# Patient Record
Sex: MALE | NOT HISPANIC OR LATINO | Employment: STUDENT | URBAN - METROPOLITAN AREA
[De-identification: names, ages, dates, MRNs, and addresses within clinical notes are randomized per-mention and may not be internally consistent; named-entity substitution may affect disease eponyms.]

---
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
## 2020-12-03 ENCOUNTER — OFFICE VISIT (OUTPATIENT)
Dept: URGENT CARE | Facility: CLINIC | Age: 20
End: 2020-12-03

## 2020-12-03 VITALS
BODY MASS INDEX: 16.94 KG/M2 | WEIGHT: 132 LBS | TEMPERATURE: 97 F | OXYGEN SATURATION: 97 % | HEART RATE: 80 BPM | DIASTOLIC BLOOD PRESSURE: 65 MMHG | SYSTOLIC BLOOD PRESSURE: 115 MMHG | HEIGHT: 74 IN

## 2020-12-03 DIAGNOSIS — M54.50 CHRONIC BILATERAL LOW BACK PAIN WITHOUT SCIATICA: Primary | ICD-10-CM

## 2020-12-03 DIAGNOSIS — G89.29 CHRONIC BILATERAL LOW BACK PAIN WITHOUT SCIATICA: Primary | ICD-10-CM

## 2020-12-03 DIAGNOSIS — M41.9 SCOLIOSIS OF THORACOLUMBAR SPINE, UNSPECIFIED SCOLIOSIS TYPE: ICD-10-CM

## 2020-12-03 PROCEDURE — 99214 PR OFFICE/OUTPT VISIT, EST, LEVL IV, 30-39 MIN: ICD-10-PCS | Mod: TIER,S$GLB,, | Performed by: PHYSICIAN ASSISTANT

## 2020-12-03 PROCEDURE — 99214 OFFICE O/P EST MOD 30 MIN: CPT | Mod: TIER,S$GLB,, | Performed by: PHYSICIAN ASSISTANT

## 2020-12-03 RX ORDER — IBUPROFEN 600 MG/1
600 TABLET ORAL EVERY 6 HOURS PRN
Qty: 30 TABLET | Refills: 1 | Status: SHIPPED | OUTPATIENT
Start: 2020-12-03 | End: 2021-01-02

## 2020-12-03 RX ORDER — METHOCARBAMOL 500 MG/1
1000 TABLET, FILM COATED ORAL 3 TIMES DAILY
Qty: 30 TABLET | Refills: 0 | Status: SHIPPED | OUTPATIENT
Start: 2020-12-03 | End: 2020-12-08

## 2020-12-03 NOTE — PROGRESS NOTES
"Subjective:       Patient ID: Agusto Duckworth is a 20 y.o. male.    Vitals:  height is 6' 2" (1.88 m) and weight is 59.9 kg (132 lb). His temperature is 97.4 °F (36.3 °C). His blood pressure is 115/65 and his pulse is 80. His oxygen saturation is 97%.     Chief Complaint: Back Pain    Pt has been having lower back pain for the last couple months, pt had been taking advil for pain which has been helping slightly. Pt does have scoliosis.     Back Pain  This is a new problem. The current episode started more than 1 month ago. The problem occurs constantly. The problem has been gradually worsening since onset. The pain is at a severity of 5/10. The pain is mild. The pain is worse during the day. Stiffness is present all day. Pertinent negatives include no chest pain, dysuria, fever or headaches. The treatment provided mild relief.       Constitution: Negative for chills, fatigue and fever.   HENT: Negative for congestion and sore throat.    Neck: Negative for painful lymph nodes.   Cardiovascular: Negative for chest pain and leg swelling.   Eyes: Negative for double vision and blurred vision.   Respiratory: Negative for cough and shortness of breath.    Gastrointestinal: Negative for nausea, vomiting and diarrhea.   Genitourinary: Negative for dysuria, frequency and urgency.   Musculoskeletal: Positive for back pain and muscle ache. Negative for joint pain, joint swelling and muscle cramps.   Skin: Negative for color change, pale and rash.   Allergic/Immunologic: Negative for seasonal allergies.   Neurological: Negative for dizziness, history of vertigo, light-headedness, passing out and headaches.   Hematologic/Lymphatic: Negative for swollen lymph nodes, easy bruising/bleeding and history of blood clots. Does not bruise/bleed easily.   Psychiatric/Behavioral: Negative for nervous/anxious, sleep disturbance and depression. The patient is not nervous/anxious.        Objective:      Physical Exam   Constitutional: He is " oriented to person, place, and time. He appears well-developed. He is cooperative.  Non-toxic appearance. He does not appear ill. No distress.   HENT:   Head: Normocephalic and atraumatic.   Ears:   Right Ear: Hearing, tympanic membrane and ear canal normal.   Left Ear: Hearing, tympanic membrane and ear canal normal.   Nose: No mucosal edema, rhinorrhea or nasal deformity. No epistaxis. Right sinus exhibits no maxillary sinus tenderness and no frontal sinus tenderness. Left sinus exhibits no maxillary sinus tenderness and no frontal sinus tenderness.   Mouth/Throat: Uvula is midline and mucous membranes are normal. No trismus in the jaw. Normal dentition. No uvula swelling. No posterior oropharyngeal edema or posterior oropharyngeal erythema.   Eyes: Conjunctivae and lids are normal. No scleral icterus.   Neck: Trachea normal, full passive range of motion without pain and phonation normal. Neck supple. No neck rigidity. No edema and no erythema present.   Cardiovascular: Normal rate, regular rhythm, normal heart sounds and normal pulses.   Pulmonary/Chest: Effort normal and breath sounds normal. No respiratory distress. He has no decreased breath sounds. He has no rhonchi.   Abdominal: Normal appearance.   Musculoskeletal:         General: No deformity.      Right shoulder: He exhibits decreased range of motion, tenderness and pain.      Comments: Significant scoliosis noted on back exam   Neurological: He is alert and oriented to person, place, and time. He exhibits normal muscle tone. Coordination normal.   Skin: Skin is warm, dry, intact, not diaphoretic, not pale and no rash. Psychiatric: His speech is normal and behavior is normal. Judgment and thought content normal.   Nursing note and vitals reviewed.        Assessment:       1. Chronic bilateral low back pain without sciatica    2. Scoliosis of thoracolumbar spine, unspecified scoliosis type        Plan:         Chronic bilateral low back pain without  sciatica  -     ibuprofen (ADVIL,MOTRIN) 600 MG tablet; Take 1 tablet (600 mg total) by mouth every 6 (six) hours as needed.  Dispense: 30 tablet; Refill: 1  -     methocarbamoL (ROBAXIN) 500 MG Tab; Take 2 tablets (1,000 mg total) by mouth 3 (three) times daily. for 5 days  Dispense: 30 tablet; Refill: 0  -     Ambulatory referral/consult to Orthopedics    Scoliosis of thoracolumbar spine, unspecified scoliosis type  -     ibuprofen (ADVIL,MOTRIN) 600 MG tablet; Take 1 tablet (600 mg total) by mouth every 6 (six) hours as needed.  Dispense: 30 tablet; Refill: 1  -     methocarbamoL (ROBAXIN) 500 MG Tab; Take 2 tablets (1,000 mg total) by mouth 3 (three) times daily. for 5 days  Dispense: 30 tablet; Refill: 0  -     Ambulatory referral/consult to Orthopedics         Agusto was seen today for back pain.    Diagnoses and all orders for this visit:    Chronic bilateral low back pain without sciatica  -     ibuprofen (ADVIL,MOTRIN) 600 MG tablet; Take 1 tablet (600 mg total) by mouth every 6 (six) hours as needed.  -     methocarbamoL (ROBAXIN) 500 MG Tab; Take 2 tablets (1,000 mg total) by mouth 3 (three) times daily. for 5 days  -     Ambulatory referral/consult to Orthopedics    Scoliosis of thoracolumbar spine, unspecified scoliosis type  -     ibuprofen (ADVIL,MOTRIN) 600 MG tablet; Take 1 tablet (600 mg total) by mouth every 6 (six) hours as needed.  -     methocarbamoL (ROBAXIN) 500 MG Tab; Take 2 tablets (1,000 mg total) by mouth 3 (three) times daily. for 5 days  -     Ambulatory referral/consult to Orthopedics      Patient Instructions     - Rest.  - Drink plenty of fluids.  - Take Tylenol and/or Ibuprofen as directed as needed for fever/pain.  Do not take more than the recommended dose.  - follow up with your PCP within the next 1-2 weeks as needed.  - Warm compresses to the affected area for 20 minutes at a time.  - You must understand that you have received an Urgent Care treatment only and that you may be  released before all of your medical problems are known or treated.   - You, the patient, will arrange for follow up care as instructed.   - If your condition worsens or fails to improve we recommend that you receive another evaluation at the ER immediately or contact your PCP to discuss your concerns.   - You can call [phone] or [phone] to help schedule an appointment with the appropriate provider.      Back Pain (Acute or Chronic)    Back pain is one of the most common problems. The good news is that most people feel better in 1 to 2 weeks, and most of the rest in 1 to 2 months. Most people can remain active.  People experience and describe pain differently; not everyone is the same.  · The pain can be sharp, stabbing, shooting, aching, cramping or burning.  · Movement, standing, bending, lifting, sitting, or walking may worsen pain.  · It can be localized to one spot or area, or it can be more generalized.  · It can spread or radiate upwards, to the front, or go down your arms or legs (sciatica).  · It can cause muscle spasm.  Most of the time, mechanical problems with the muscles or spine cause the pain. Mechanical problems are usually caused by an injury to the muscles or ligaments. While illness can cause back pain, it is usually not caused by a serious illness. Mechanical problems include:   · Physical activity such as sports, exercise, work, or normal activity  · Overexertion, lifting, pushing, pulling incorrectly or too aggressively  · Sudden twisting, bending, or stretching from an accident, or accidental movement  · Poor posture  · Stretching or moving wrong, without noticing pain at the time  · Poor coordination, lack of regular exercise (check with your doctor about this)  · Spinal disc disease or arthritis  · Stress  Pain can also be related to pregnancy, or illness like appendicitis, bladder or kidney infections, pelvic infections, and many other things.  Acute back pain usually gets  better in 1 to 2 weeks. Back pain related to disk disease, arthritis in the spinal joints or spinal stenosis (narrowing of the spinal canal) can become chronic and last for months or years.  Unless you had a physical injury (for example, a car accident or fall) X-rays are usually not needed for the initial evaluation of back pain. If pain continues and does not respond to medical treatment, X-rays and other tests may be needed.  Home care  Try these home care recommendations:  · When in bed, try to find a position of comfort. A firm mattress is best. Try lying flat on your back with pillows under your knees. You can also try lying on your side with your knees bent up towards your chest and a pillow between your knees.  · At first, do not try to stretch out the sore spots. If there is a strain, it is not like the good soreness you get after exercising without an injury. In this case, stretching may make it worse.  · Avoid prolong sitting, long car rides, or travel. This puts more stress on the lower back than standing or walking.  · During the first 24 to 72 hours after an acute injury or flare up of chronic back pain, apply an ice pack to the painful area for 20 minutes and then remove it for 20 minutes. Do this over a period of 60 to 90 minutes or several times a day. This will reduce swelling and pain. Wrap the ice pack in a thin towel or plastic to protect your skin.  · You can start with ice, then switch to heat. Heat (hot shower, hot bath, or heating pad) reduces pain and works well for muscle spasms. Heat can be applied to the painful area for 20 minutes then remove it for 20 minutes. Do this over a period of 60 to 90 minutes or several times a day. Do not sleep on a heating pad. It can lead to skin burns or tissue damage.  · You can alternate ice and heat therapy. Talk with your doctor about the best treatment for your back pain.  · Therapeutic massage can help relax the back muscles without stretching  them.  · Be aware of safe lifting methods and do not lift anything without stretching first.  Medicines  Talk to your doctor before using medicine, especially if you have other medical problems or are taking other medicines.  · You may use over-the-counter medicine as directed on the bottle to control pain, unless another pain medicine was prescribed. If you have chronic conditions like diabetes, liver or kidney disease, stomach ulcers, or gastrointestinal bleeding, or are taking blood thinners, talk to your doctor before taking any medicine.  · Be careful if you are given a prescription medicines, narcotics, or medicine for muscle spasms. They can cause drowsiness, affect your coordination, reflexes, and judgement. Do not drive or operate heavy machinery.  Follow-up care  Follow up with your healthcare provider, or as advised.   A radiologist will review any X-rays that were taken. Your provide will notify you of any new findings that may affect your care.  Call 911  Call emergency services if any of the following occur:  · Trouble breathing  · Confusion  · Very drowsy or trouble awakening  · Fainting or loss of consciousness  · Rapid or very slow heart rate  · Loss of bowel or bladder control  When to seek medical advice  Call your healthcare provider right away if any of these occur:   · Pain becomes worse or spreads to your legs  · Weakness or numbness in one or both legs  · Numbness in the groin or genital area  Date Last Reviewed: 7/1/2016 © 2000-2017 [employer]. [address] [place] [address]. All rights reserved. This information is not intended as a substitute for professional medical care. Always follow your healthcare professional's instructions.        Understanding Scoliosis  Scoliosis is a problem that makes the spine curve and twist from side to side. It is most often found in girls in their early teens. But boys can have it, too. No one is sure what causes scoliosis. But we  "do know that scoliosis is not caused by things like carrying heavy bags or playing sports. If someone in your family (like a parent or a sibling) has scoliosis, you may be more likely to have it, too.    What are the signs?  The signs of scoliosis may include:  · One shoulder higher than the other  · One shoulder blade sticking out farther than the other  · An uneven waistline  · Hems hanging unevenly on you  These signs often appear slowly as you grow. You and your parents may not even notice them. For this reason, schools in many states have screening programs to check for scoliosis. These programs help find scoliosis early and keep it from causing problems later.  A note to parents  ?Here are some suggestions:  · Hearing that your child has scoliosis can be upsetting. But remember that mild cases may not need treatment. If your child's scoliosis is severe or worsening, it can be treated.   · Go to all your childs appointments. Ask questions and be sure you understand the healthcare providers instructions.  · Become informed about scoliosis. Try the library or do a search on the Internet.  · Form a team with your child and the healthcare provider. Be your childs  and biggest fan.  · Know that each child is different. Your childs healthcare provider will choose the treatment that is best for your childs spine.   Date Last Reviewed: 10/17/2015  © 6394-4250 [employer]. [address] [place] [address], [place] [address]. All rights reserved. This information is not intended as a substitute for professional medical care. Always follow your healthcare professional's instructions.        Treating Scoliosis  Having scoliosis means that your spine (backbone) curves and twists from side to side instead of growing straight. Your doctor will suggest the best treatment for you based on your age, how much more you are likely to grow, and the size and type of your spinal curve.     "I told my friends about my " "scoliosis. They helped me feel better about it."   How is scoliosis treated?  The three types of treatment for scoliosis are:  · Observation--Watching a small curve to see if it gets better or worse as you grow.  · Bracing--Wearing a brace until your spine is fully grown to keep your curve from getting worse. For many teens with scoliosis, wearing a brace is the best treatment. It may also help keep you from needing surgery.  · Surgery--Operating to correct a very serious curve.  How a brace works  · A scoliosis brace is made out of plastic and shaped to fit your body. The brace holds your spine in place to keep your curve from getting worse. To do this, you need to wear it almost all the time until you are fully grown.  · There are several kinds of braces. Your doctor will talk to you about the best one for your type of scoliosis.  · An orthotist is the person who makes and fits the brace. You will see the orthotist a few times for adjustments before the brace fits you right.  Why wear your brace?  The brace helps keep your scoliosis from getting worse. If your scoliosis does get worse, you may need surgery. Surgery often leaves a big scar. And surgery can be hard to recover from. Also, it may be a long time after surgery before you can go out and be active again.  To learn more, contact:  · National Scoliosis Foundation  570.277.2011  · Scoliosis Research Society  [phone]   Date Last Reviewed: 9/20/2015  © 2338-0437 [employer]. [address] [place] [address], [place] [address]. All rights reserved. This information is not intended as a substitute for professional medical care. Always follow your healthcare professional's instructions.            "

## 2020-12-03 NOTE — PATIENT INSTRUCTIONS
- Rest.  - Drink plenty of fluids.  - Take Tylenol and/or Ibuprofen as directed as needed for fever/pain.  Do not take more than the recommended dose.  - follow up with your PCP within the next 1-2 weeks as needed.  - Warm compresses to the affected area for 20 minutes at a time.  - You must understand that you have received an Urgent Care treatment only and that you may be released before all of your medical problems are known or treated.   - You, the patient, will arrange for follow up care as instructed.   - If your condition worsens or fails to improve we recommend that you receive another evaluation at the ER immediately or contact your PCP to discuss your concerns.   - You can call [phone] or [phone] to help schedule an appointment with the appropriate provider.      Back Pain (Acute or Chronic)    Back pain is one of the most common problems. The good news is that most people feel better in 1 to 2 weeks, and most of the rest in 1 to 2 months. Most people can remain active.  People experience and describe pain differently; not everyone is the same.  · The pain can be sharp, stabbing, shooting, aching, cramping or burning.  · Movement, standing, bending, lifting, sitting, or walking may worsen pain.  · It can be localized to one spot or area, or it can be more generalized.  · It can spread or radiate upwards, to the front, or go down your arms or legs (sciatica).  · It can cause muscle spasm.  Most of the time, mechanical problems with the muscles or spine cause the pain. Mechanical problems are usually caused by an injury to the muscles or ligaments. While illness can cause back pain, it is usually not caused by a serious illness. Mechanical problems include:   · Physical activity such as sports, exercise, work, or normal activity  · Overexertion, lifting, pushing, pulling incorrectly or too aggressively  · Sudden twisting, bending, or stretching from an accident, or accidental movement  · Poor  posture  · Stretching or moving wrong, without noticing pain at the time  · Poor coordination, lack of regular exercise (check with your doctor about this)  · Spinal disc disease or arthritis  · Stress  Pain can also be related to pregnancy, or illness like appendicitis, bladder or kidney infections, pelvic infections, and many other things.  Acute back pain usually gets better in 1 to 2 weeks. Back pain related to disk disease, arthritis in the spinal joints or spinal stenosis (narrowing of the spinal canal) can become chronic and last for months or years.  Unless you had a physical injury (for example, a car accident or fall) X-rays are usually not needed for the initial evaluation of back pain. If pain continues and does not respond to medical treatment, X-rays and other tests may be needed.  Home care  Try these home care recommendations:  · When in bed, try to find a position of comfort. A firm mattress is best. Try lying flat on your back with pillows under your knees. You can also try lying on your side with your knees bent up towards your chest and a pillow between your knees.  · At first, do not try to stretch out the sore spots. If there is a strain, it is not like the good soreness you get after exercising without an injury. In this case, stretching may make it worse.  · Avoid prolong sitting, long car rides, or travel. This puts more stress on the lower back than standing or walking.  · During the first 24 to 72 hours after an acute injury or flare up of chronic back pain, apply an ice pack to the painful area for 20 minutes and then remove it for 20 minutes. Do this over a period of 60 to 90 minutes or several times a day. This will reduce swelling and pain. Wrap the ice pack in a thin towel or plastic to protect your skin.  · You can start with ice, then switch to heat. Heat (hot shower, hot bath, or heating pad) reduces pain and works well for muscle spasms. Heat can be applied to the painful area for  20 minutes then remove it for 20 minutes. Do this over a period of 60 to 90 minutes or several times a day. Do not sleep on a heating pad. It can lead to skin burns or tissue damage.  · You can alternate ice and heat therapy. Talk with your doctor about the best treatment for your back pain.  · Therapeutic massage can help relax the back muscles without stretching them.  · Be aware of safe lifting methods and do not lift anything without stretching first.  Medicines  Talk to your doctor before using medicine, especially if you have other medical problems or are taking other medicines.  · You may use over-the-counter medicine as directed on the bottle to control pain, unless another pain medicine was prescribed. If you have chronic conditions like diabetes, liver or kidney disease, stomach ulcers, or gastrointestinal bleeding, or are taking blood thinners, talk to your doctor before taking any medicine.  · Be careful if you are given a prescription medicines, narcotics, or medicine for muscle spasms. They can cause drowsiness, affect your coordination, reflexes, and judgement. Do not drive or operate heavy machinery.  Follow-up care  Follow up with your healthcare provider, or as advised.   A radiologist will review any X-rays that were taken. Your provide will notify you of any new findings that may affect your care.  Call 911  Call emergency services if any of the following occur:  · Trouble breathing  · Confusion  · Very drowsy or trouble awakening  · Fainting or loss of consciousness  · Rapid or very slow heart rate  · Loss of bowel or bladder control  When to seek medical advice  Call your healthcare provider right away if any of these occur:   · Pain becomes worse or spreads to your legs  · Weakness or numbness in one or both legs  · Numbness in the groin or genital area  Date Last Reviewed: 7/1/2016  © 3909-3260 [employer]. [address] [place] [address], [place] [address]. All rights reserved. This  information is not intended as a substitute for professional medical care. Always follow your healthcare professional's instructions.        Understanding Scoliosis  Scoliosis is a problem that makes the spine curve and twist from side to side. It is most often found in girls in their early teens. But boys can have it, too. No one is sure what causes scoliosis. But we do know that scoliosis is not caused by things like carrying heavy bags or playing sports. If someone in your family (like a parent or a sibling) has scoliosis, you may be more likely to have it, too.    What are the signs?  The signs of scoliosis may include:  · One shoulder higher than the other  · One shoulder blade sticking out farther than the other  · An uneven waistline  · Hems hanging unevenly on you  These signs often appear slowly as you grow. You and your parents may not even notice them. For this reason, schools in many states have screening programs to check for scoliosis. These programs help find scoliosis early and keep it from causing problems later.  A note to parents  ?Here are some suggestions:  · Hearing that your child has scoliosis can be upsetting. But remember that mild cases may not need treatment. If your child's scoliosis is severe or worsening, it can be treated.   · Go to all your childs appointments. Ask questions and be sure you understand the healthcare providers instructions.  · Become informed about scoliosis. Try the library or do a search on the Internet.  · Form a team with your child and the healthcare provider. Be your childs  and biggest fan.  · Know that each child is different. Your childs healthcare provider will choose the treatment that is best for your childs spine.   Date Last Reviewed: 10/17/2015  © 2112-0024 [employer]. [address] [place] [address], [place] [address]. All rights reserved. This information is not intended as a substitute for professional medical care. Always follow your  "healthcare professional's instructions.        Treating Scoliosis  Having scoliosis means that your spine (backbone) curves and twists from side to side instead of growing straight. Your doctor will suggest the best treatment for you based on your age, how much more you are likely to grow, and the size and type of your spinal curve.     "I told my friends about my scoliosis. They helped me feel better about it."   How is scoliosis treated?  The three types of treatment for scoliosis are:  · Observation--Watching a small curve to see if it gets better or worse as you grow.  · Bracing--Wearing a brace until your spine is fully grown to keep your curve from getting worse. For many teens with scoliosis, wearing a brace is the best treatment. It may also help keep you from needing surgery.  · Surgery--Operating to correct a very serious curve.  How a brace works  · A scoliosis brace is made out of plastic and shaped to fit your body. The brace holds your spine in place to keep your curve from getting worse. To do this, you need to wear it almost all the time until you are fully grown.  · There are several kinds of braces. Your doctor will talk to you about the best one for your type of scoliosis.  · An orthotist is the person who makes and fits the brace. You will see the orthotist a few times for adjustments before the brace fits you right.  Why wear your brace?  The brace helps keep your scoliosis from getting worse. If your scoliosis does get worse, you may need surgery. Surgery often leaves a big scar. And surgery can be hard to recover from. Also, it may be a long time after surgery before you can go out and be active again.  To learn more, contact:  · National Scoliosis Foundation  952.893.1500  · Scoliosis Research Society  [phone]   Date Last Reviewed: 9/20/2015  © 0304-9469 [employer]. [address] [place] [address], [place] [address]. All rights reserved. This information is not intended as a " substitute for professional medical care. Always follow your healthcare professional's instructions.

## 2022-12-01 ENCOUNTER — HOSPITAL ENCOUNTER (EMERGENCY)
Facility: HOSPITAL | Age: 22
Discharge: HOME | End: 2022-12-01
Payer: MEDICAID

## 2022-12-01 VITALS
HEIGHT: 74 IN | BODY MASS INDEX: 16.95 KG/M2 | DIASTOLIC BLOOD PRESSURE: 85 MMHG | TEMPERATURE: 98 F | OXYGEN SATURATION: 99 % | HEART RATE: 70 BPM | SYSTOLIC BLOOD PRESSURE: 112 MMHG | RESPIRATION RATE: 16 BRPM

## 2022-12-01 DIAGNOSIS — R11.2 NAUSEA AND VOMITING, UNSPECIFIED VOMITING TYPE: ICD-10-CM

## 2022-12-01 DIAGNOSIS — M54.50 ACUTE LEFT-SIDED LOW BACK PAIN WITHOUT SCIATICA: Primary | ICD-10-CM

## 2022-12-01 DIAGNOSIS — R09.81 NASAL CONGESTION: ICD-10-CM

## 2022-12-01 LAB
ALBUMIN SERPL BCP-MCNC: 4.4 G/DL (ref 3.5–5.2)
ALP SERPL-CCNC: 60 U/L (ref 55–135)
ALT SERPL W/O P-5'-P-CCNC: 11 U/L (ref 10–44)
ANION GAP SERPL CALC-SCNC: 11 MMOL/L (ref 8–16)
AST SERPL-CCNC: 13 U/L (ref 10–40)
BACTERIA #/AREA URNS HPF: NORMAL /HPF
BASOPHILS # BLD AUTO: 0.02 K/UL (ref 0–0.2)
BASOPHILS NFR BLD: 0.2 % (ref 0–1.9)
BILIRUB SERPL-MCNC: 0.5 MG/DL (ref 0.1–1)
BILIRUB UR QL STRIP: NEGATIVE
BUN SERPL-MCNC: 6 MG/DL (ref 6–20)
CALCIUM SERPL-MCNC: 9.5 MG/DL (ref 8.7–10.5)
CHLORIDE SERPL-SCNC: 101 MMOL/L (ref 95–110)
CLARITY UR: CLEAR
CO2 SERPL-SCNC: 27 MMOL/L (ref 23–29)
COLOR UR: YELLOW
CREAT SERPL-MCNC: 0.8 MG/DL (ref 0.5–1.4)
CTP QC/QA: YES
DIFFERENTIAL METHOD: ABNORMAL
EOSINOPHIL # BLD AUTO: 0.1 K/UL (ref 0–0.5)
EOSINOPHIL NFR BLD: 0.5 % (ref 0–8)
ERYTHROCYTE [DISTWIDTH] IN BLOOD BY AUTOMATED COUNT: 12.2 % (ref 11.5–14.5)
EST. GFR  (NO RACE VARIABLE): >60 ML/MIN/1.73 M^2
GLUCOSE SERPL-MCNC: 105 MG/DL (ref 70–110)
GLUCOSE UR QL STRIP: ABNORMAL
HCT VFR BLD AUTO: 47.6 % (ref 40–54)
HGB BLD-MCNC: 15.9 G/DL (ref 14–18)
HGB UR QL STRIP: NEGATIVE
HYALINE CASTS #/AREA URNS LPF: 1 /LPF
IMM GRANULOCYTES # BLD AUTO: 0.03 K/UL (ref 0–0.04)
IMM GRANULOCYTES NFR BLD AUTO: 0.3 % (ref 0–0.5)
KETONES UR QL STRIP: ABNORMAL
LEUKOCYTE ESTERASE UR QL STRIP: NEGATIVE
LIPASE SERPL-CCNC: 9 U/L (ref 4–60)
LYMPHOCYTES # BLD AUTO: 1.1 K/UL (ref 1–4.8)
LYMPHOCYTES NFR BLD: 10.4 % (ref 18–48)
MCH RBC QN AUTO: 28.4 PG (ref 27–31)
MCHC RBC AUTO-ENTMCNC: 33.4 G/DL (ref 32–36)
MCV RBC AUTO: 85 FL (ref 82–98)
MICROSCOPIC COMMENT: NORMAL
MONOCYTES # BLD AUTO: 1.1 K/UL (ref 0.3–1)
MONOCYTES NFR BLD: 9.9 % (ref 4–15)
NEUTROPHILS # BLD AUTO: 8.5 K/UL (ref 1.8–7.7)
NEUTROPHILS NFR BLD: 78.7 % (ref 38–73)
NITRITE UR QL STRIP: NEGATIVE
NRBC BLD-RTO: 0 /100 WBC
PH UR STRIP: 6 [PH] (ref 5–8)
PLATELET # BLD AUTO: 379 K/UL (ref 150–450)
PMV BLD AUTO: 9.1 FL (ref 9.2–12.9)
POC MOLECULAR INFLUENZA A AGN: NEGATIVE
POC MOLECULAR INFLUENZA B AGN: NEGATIVE
POTASSIUM SERPL-SCNC: 3.5 MMOL/L (ref 3.5–5.1)
PROT SERPL-MCNC: 8.3 G/DL (ref 6–8.4)
PROT UR QL STRIP: ABNORMAL
RBC # BLD AUTO: 5.59 M/UL (ref 4.6–6.2)
RBC #/AREA URNS HPF: 3 /HPF (ref 0–4)
SODIUM SERPL-SCNC: 139 MMOL/L (ref 136–145)
SP GR UR STRIP: >1.03 (ref 1–1.03)
URN SPEC COLLECT METH UR: ABNORMAL
UROBILINOGEN UR STRIP-ACNC: ABNORMAL EU/DL
WBC # BLD AUTO: 10.73 K/UL (ref 3.9–12.7)
WBC #/AREA URNS HPF: 2 /HPF (ref 0–5)

## 2022-12-01 PROCEDURE — 96361 HYDRATE IV INFUSION ADD-ON: CPT

## 2022-12-01 PROCEDURE — 96375 TX/PRO/DX INJ NEW DRUG ADDON: CPT

## 2022-12-01 PROCEDURE — 63600175 PHARM REV CODE 636 W HCPCS: Performed by: EMERGENCY MEDICINE

## 2022-12-01 PROCEDURE — 99285 EMERGENCY DEPT VISIT HI MDM: CPT | Mod: 25

## 2022-12-01 PROCEDURE — 25000003 PHARM REV CODE 250: Performed by: EMERGENCY MEDICINE

## 2022-12-01 PROCEDURE — 83690 ASSAY OF LIPASE: CPT | Performed by: EMERGENCY MEDICINE

## 2022-12-01 PROCEDURE — 85025 COMPLETE CBC W/AUTO DIFF WBC: CPT | Performed by: EMERGENCY MEDICINE

## 2022-12-01 PROCEDURE — 96374 THER/PROPH/DIAG INJ IV PUSH: CPT

## 2022-12-01 PROCEDURE — 80053 COMPREHEN METABOLIC PANEL: CPT | Performed by: EMERGENCY MEDICINE

## 2022-12-01 PROCEDURE — 81000 URINALYSIS NONAUTO W/SCOPE: CPT | Performed by: EMERGENCY MEDICINE

## 2022-12-01 PROCEDURE — 87502 INFLUENZA DNA AMP PROBE: CPT

## 2022-12-01 RX ORDER — ONDANSETRON 4 MG/1
4 TABLET, ORALLY DISINTEGRATING ORAL 2 TIMES DAILY
Qty: 10 TABLET | Refills: 0 | Status: SHIPPED | OUTPATIENT
Start: 2022-12-01 | End: 2022-12-01 | Stop reason: SDUPTHER

## 2022-12-01 RX ORDER — ONDANSETRON 4 MG/1
4 TABLET, ORALLY DISINTEGRATING ORAL 2 TIMES DAILY
Qty: 10 TABLET | Refills: 0 | OUTPATIENT
Start: 2022-12-01 | End: 2024-03-17

## 2022-12-01 RX ORDER — KETOROLAC TROMETHAMINE 30 MG/ML
15 INJECTION, SOLUTION INTRAMUSCULAR; INTRAVENOUS
Status: COMPLETED | OUTPATIENT
Start: 2022-12-01 | End: 2022-12-01

## 2022-12-01 RX ORDER — ONDANSETRON 2 MG/ML
4 INJECTION INTRAMUSCULAR; INTRAVENOUS
Status: COMPLETED | OUTPATIENT
Start: 2022-12-01 | End: 2022-12-01

## 2022-12-01 RX ADMIN — SODIUM CHLORIDE 1000 ML: 0.9 INJECTION, SOLUTION INTRAVENOUS at 10:12

## 2022-12-01 RX ADMIN — ONDANSETRON 4 MG: 2 INJECTION INTRAMUSCULAR; INTRAVENOUS at 10:12

## 2022-12-01 RX ADMIN — KETOROLAC TROMETHAMINE 15 MG: 30 INJECTION, SOLUTION INTRAMUSCULAR at 10:12

## 2022-12-01 NOTE — ED PROVIDER NOTES
"Encounter Date: 12/1/2022    SCRIBE #1 NOTE: I, Reshma Bowser, am scribing for, and in the presence of,  Darian Yost MD.     History     Chief Complaint   Patient presents with    Abdominal Pain    Flank Pain     23 yo male to triage for left sided flank pain and LLQ abd pain since last night. Pt denies N/V/D, CP, SOB. AAOx4     Agusto Duckworth is a 22 y.o. male, with no pertinent PMHx, who presents to the ED with back pain. Patient reports constant left lower back pain onset around 2 AM today. He states wearing a belt worsened his pain. He further notes mild radiation of pain to his left lower abdomen, and mild bilateral testicular pain. He does note h/o scoliosis and suffers from chronic, intermittent back pain, but states his pain today is "different than usual". He further endorses mild nasal congestion, and one episode of vomiting upon arrival to the ED today. He has taken Tylenol 500 mg this AM for relief. No other exacerbating or alleviating factors. Denies cough, diarrhea, myalgias, hip pain, urinary symptoms, or other associated symptoms. No h/o back or abdominal surgeries. No recent trauma, falls, or injuries.     The history is provided by the patient.   Review of patient's allergies indicates:  No Known Allergies  History reviewed. No pertinent past medical history.  History reviewed. No pertinent surgical history.  Family History   Problem Relation Age of Onset    Hypertension Maternal Grandmother     Diabetes Neg Hx      Social History     Tobacco Use    Smoking status: Never    Smokeless tobacco: Never   Substance Use Topics    Alcohol use: No     Comment: The patient is in the 7th grade.  He is here with his grandmother, whom he lives with.    Drug use: No     Review of Systems   Constitutional:  Negative for chills and fever.   HENT:  Positive for congestion. Negative for rhinorrhea and sore throat.    Eyes:  Negative for visual disturbance.   Respiratory:  Negative for cough and shortness of " breath.    Cardiovascular:  Negative for chest pain.   Gastrointestinal:  Positive for abdominal pain, nausea and vomiting. Negative for diarrhea.   Genitourinary:  Positive for testicular pain (bilateral, mild). Negative for dysuria, frequency and hematuria.   Musculoskeletal:  Positive for back pain (left lower). Negative for arthralgias and myalgias.   Skin:  Negative for rash.   Neurological:  Negative for dizziness, weakness and headaches.     Physical Exam     Initial Vitals [12/01/22 1004]   BP Pulse Resp Temp SpO2   127/60 75 16 98.3 °F (36.8 °C) 99 %      MAP       --         Physical Exam    Nursing note and vitals reviewed.  Constitutional: He appears well-developed and well-nourished. He does not appear ill. No distress.   HENT:   Head: Normocephalic and atraumatic.   Mouth/Throat: Oropharynx is clear and moist.   Eyes: Conjunctivae and EOM are normal. Pupils are equal, round, and reactive to light.   Neck:   Normal range of motion.  Cardiovascular:  Normal rate, regular rhythm and normal heart sounds.           No murmur heard.  Pulmonary/Chest: Breath sounds normal. No respiratory distress. He has no wheezes. He has no rhonchi. He has no rales.   Abdominal: Abdomen is soft. There is no abdominal tenderness.   No CVA tenderness bilaterally.    Musculoskeletal:         General: No edema.      Cervical back: Normal range of motion.     Neurological: He is alert and oriented to person, place, and time.   Skin: Skin is warm and dry. No rash noted.   No rash to the back or abdomen.    Psychiatric: He has a normal mood and affect.       ED Course   Procedures  Labs Reviewed   CBC W/ AUTO DIFFERENTIAL - Abnormal; Notable for the following components:       Result Value    MPV 9.1 (*)     Gran # (ANC) 8.5 (*)     Mono # 1.1 (*)     Gran % 78.7 (*)     Lymph % 10.4 (*)     All other components within normal limits   URINALYSIS, REFLEX TO URINE CULTURE - Abnormal; Notable for the following components:    Specific  Gravity, UA >1.030 (*)     Protein, UA 1+ (*)     Glucose, UA Trace (*)     Ketones, UA 3+ (*)     Urobilinogen, UA 2.0-3.0 (*)     All other components within normal limits    Narrative:     Specimen Source->Urine   COMPREHENSIVE METABOLIC PANEL   LIPASE   URINALYSIS MICROSCOPIC    Narrative:     Specimen Source->Urine   POCT INFLUENZA A/B MOLECULAR          Imaging Results              CT Abdomen Pelvis  Without Contrast (Final result)  Result time 12/01/22 11:06:50      Final result by Luis Burrows MD (12/01/22 11:06:50)                   Impression:      No renal or ureteral calculi are identified.    Small amount of ascites within the pelvis.      Electronically signed by: Luis Burrows MD  Date:    12/01/2022  Time:    11:06               Narrative:    EXAMINATION:  CT ABDOMEN PELVIS WITHOUT CONTRAST    CLINICAL HISTORY:  Flank pain, kidney stone suspected;Left sided pain;    TECHNIQUE:  Low dose axial images, sagittal and coronal reformations were obtained from the lung bases to the pubic symphysis.  Oral contrast was not administered.    COMPARISON:  None    FINDINGS:  The lung bases are clear.  The bones are intact.  There is no evidence for acute fracture or bone destruction.  The liver is normal in size and is homogeneous in density with no focal liver lesions identified.  The gallbladder is present and appears grossly unremarkable.  No biliary dilatation is appreciated on this noncontrast examination.  The spleen, stomach, and pancreas all appear grossly unremarkable on this noncontrast examination.  The adrenal glands are not enlarged.  The kidneys appear normal in size, position, and contour and there is no evidence for abnormal renal masses or hydronephrosis.  No renal or ureteral calculi are identified.  The abdominal aorta tapers normally without aneurysmal dilatation.  No para-aortic lymphadenopathy is appreciated.  The appendix is not confidently identified.  No dilated loops of bowel are  evident.  There is a small amount of ascites within the pelvis.  The urinary bladder appears unremarkable.  There is no evidence for pelvic or inguinal lymphadenopathy.                                       Medications   sodium chloride 0.9% bolus 1,000 mL (0 mLs Intravenous Stopped 12/1/22 1157)   ketorolac injection 15 mg (15 mg Intravenous Given 12/1/22 1058)   ondansetron injection 4 mg (4 mg Intravenous Given 12/1/22 1058)     Medical Decision Making:   History:   Old Medical Records: I decided to obtain old medical records.  Initial Assessment:   22-year-old male presenting with lower back pain.  Patient reporting left lower back pain radiating to the left flank.  CT imaging shows no nephrolithiasis.  Patient denies any testicular pain.  Abdomen nontender on exam.  No acute abdomen.  Patient did report nasal congestion.  Influenza screen negative.  Discussed symptomatic treatment for time being.  Follow up with primary care.  Patient provided antiemetics prescription.  Clinical Tests:   Lab Tests: Ordered and Reviewed  Radiological Study: Ordered and Reviewed        Scribe Attestation:   Scribe #1: I performed the above scribed service and the documentation accurately describes the services I performed. I attest to the accuracy of the note.                   Clinical Impression:   Final diagnoses:  [M54.50] Acute left-sided low back pain without sciatica (Primary)  [R09.81] Nasal congestion  [R11.2] Nausea and vomiting, unspecified vomiting type      ED Disposition Condition    Discharge Stable        I, Darian Yost, personally performed the services described in this documentation. All medical record entries made by the scribe were at my direction and in my presence. I have reviewed the chart and agree that the record reflects my personal performance and is accurate and complete.    ED Prescriptions       Medication Sig Dispense Start Date End Date Auth. Provider    ondansetron (ZOFRAN-ODT) 4 MG TbDL   (Status: Discontinued) Take 1 tablet (4 mg total) by mouth 2 (two) times daily. 10 tablet 12/1/2022 12/1/2022 Darian Yost MD    ondansetron (ZOFRAN-ODT) 4 MG TbDL Take 1 tablet (4 mg total) by mouth 2 (two) times daily. 10 tablet 12/1/2022 -- Darian Yost MD          Follow-up Information       Follow up With Specialties Details Why Contact Info    Yesenia Nelson MD Pediatrics Schedule an appointment as soon as possible for a visit in 2 days  [address] [place] [id number]  [phone]      [facility] Emergency Dept Emergency Medicine  If symptoms worsen [address] [facility] [id number] [phone]             Darian Yost MD  12/06/22 [id number]

## 2022-12-01 NOTE — Clinical Note
"Agusto"Dioni Duckworth was seen and treated in our emergency department on 12/1/2022.  He may return to work on 12/05/2022.       If you have any questions or concerns, please don't hesitate to call.      Darian Yost MD"

## 2024-03-17 ENCOUNTER — HOSPITAL ENCOUNTER (OUTPATIENT)
Facility: OTHER | Age: 24
Discharge: HOME | End: 2024-03-19
Attending: EMERGENCY MEDICINE | Admitting: HOSPITALIST
Payer: MEDICAID

## 2024-03-17 ENCOUNTER — HOSPITAL ENCOUNTER (EMERGENCY)
Facility: OTHER | Age: 24
Discharge: HOME | End: 2024-03-17
Attending: EMERGENCY MEDICINE
Payer: MEDICAID

## 2024-03-17 VITALS
TEMPERATURE: 98 F | HEART RATE: 78 BPM | WEIGHT: 140 LBS | OXYGEN SATURATION: 99 % | SYSTOLIC BLOOD PRESSURE: 110 MMHG | BODY MASS INDEX: 17.41 KG/M2 | DIASTOLIC BLOOD PRESSURE: 69 MMHG | HEIGHT: 75 IN | RESPIRATION RATE: 18 BRPM

## 2024-03-17 DIAGNOSIS — R11.2 NAUSEA & VOMITING: ICD-10-CM

## 2024-03-17 DIAGNOSIS — R10.9 LEFT FLANK PAIN: Primary | ICD-10-CM

## 2024-03-17 DIAGNOSIS — R11.2 NAUSEA AND VOMITING, UNSPECIFIED VOMITING TYPE: ICD-10-CM

## 2024-03-17 DIAGNOSIS — K35.80 ACUTE APPENDICITIS: Primary | ICD-10-CM

## 2024-03-17 DIAGNOSIS — R10.9 LEFT FLANK PAIN: ICD-10-CM

## 2024-03-17 DIAGNOSIS — K35.30 ACUTE APPENDICITIS WITH LOCALIZED PERITONITIS, WITHOUT PERFORATION, ABSCESS, OR GANGRENE: ICD-10-CM

## 2024-03-17 DIAGNOSIS — M41.9 SCOLIOSIS, UNSPECIFIED SCOLIOSIS TYPE, UNSPECIFIED SPINAL REGION: ICD-10-CM

## 2024-03-17 DIAGNOSIS — R07.9 CHEST PAIN: ICD-10-CM

## 2024-03-17 LAB
ALBUMIN SERPL BCP-MCNC: 4.8 G/DL (ref 3.5–5.2)
ALP SERPL-CCNC: 55 U/L (ref 55–135)
ALT SERPL W/O P-5'-P-CCNC: 12 U/L (ref 10–44)
ANION GAP SERPL CALC-SCNC: 11 MMOL/L (ref 8–16)
AST SERPL-CCNC: 15 U/L (ref 10–40)
BACTERIA #/AREA URNS HPF: NORMAL /HPF
BASOPHILS # BLD AUTO: 0.04 K/UL (ref 0–0.2)
BASOPHILS NFR BLD: 0.3 % (ref 0–1.9)
BILIRUB SERPL-MCNC: 0.6 MG/DL (ref 0.1–1)
BILIRUB UR QL STRIP: NEGATIVE
BUN SERPL-MCNC: 9 MG/DL (ref 6–20)
CALCIUM SERPL-MCNC: 9.6 MG/DL (ref 8.7–10.5)
CHLORIDE SERPL-SCNC: 105 MMOL/L (ref 95–110)
CLARITY UR: CLEAR
CO2 SERPL-SCNC: 24 MMOL/L (ref 23–29)
COLOR UR: YELLOW
CREAT SERPL-MCNC: 0.9 MG/DL (ref 0.5–1.4)
DIFFERENTIAL METHOD BLD: ABNORMAL
EOSINOPHIL # BLD AUTO: 0.1 K/UL (ref 0–0.5)
EOSINOPHIL NFR BLD: 0.6 % (ref 0–8)
ERYTHROCYTE [DISTWIDTH] IN BLOOD BY AUTOMATED COUNT: 12.4 % (ref 11.5–14.5)
EST. GFR  (NO RACE VARIABLE): >60 ML/MIN/1.73 M^2
GLUCOSE SERPL-MCNC: 99 MG/DL (ref 70–110)
GLUCOSE UR QL STRIP: NEGATIVE
HCT VFR BLD AUTO: 45 % (ref 40–54)
HGB BLD-MCNC: 15 G/DL (ref 14–18)
HGB UR QL STRIP: NEGATIVE
HYALINE CASTS #/AREA URNS LPF: 0 /LPF
IMM GRANULOCYTES # BLD AUTO: 0.05 K/UL (ref 0–0.04)
IMM GRANULOCYTES NFR BLD AUTO: 0.4 % (ref 0–0.5)
KETONES UR QL STRIP: NEGATIVE
LEUKOCYTE ESTERASE UR QL STRIP: NEGATIVE
LYMPHOCYTES # BLD AUTO: 1 K/UL (ref 1–4.8)
LYMPHOCYTES NFR BLD: 7.8 % (ref 18–48)
MCH RBC QN AUTO: 28.7 PG (ref 27–31)
MCHC RBC AUTO-ENTMCNC: 33.3 G/DL (ref 32–36)
MCV RBC AUTO: 86 FL (ref 82–98)
MICROSCOPIC COMMENT: NORMAL
MONOCYTES # BLD AUTO: 0.6 K/UL (ref 0.3–1)
MONOCYTES NFR BLD: 4.8 % (ref 4–15)
NEUTROPHILS # BLD AUTO: 10.6 K/UL (ref 1.8–7.7)
NEUTROPHILS NFR BLD: 86.1 % (ref 38–73)
NITRITE UR QL STRIP: NEGATIVE
NRBC BLD-RTO: 0 /100 WBC
PH UR STRIP: 7 [PH] (ref 5–8)
PLATELET # BLD AUTO: 331 K/UL (ref 150–450)
PMV BLD AUTO: 9.2 FL (ref 9.2–12.9)
POTASSIUM SERPL-SCNC: 3.8 MMOL/L (ref 3.5–5.1)
PROT SERPL-MCNC: 8.3 G/DL (ref 6–8.4)
PROT UR QL STRIP: ABNORMAL
RBC # BLD AUTO: 5.23 M/UL (ref 4.6–6.2)
RBC #/AREA URNS HPF: 1 /HPF (ref 0–4)
SODIUM SERPL-SCNC: 140 MMOL/L (ref 136–145)
SP GR UR STRIP: 1.03 (ref 1–1.03)
URN SPEC COLLECT METH UR: ABNORMAL
UROBILINOGEN UR STRIP-ACNC: ABNORMAL EU/DL
WBC # BLD AUTO: 12.3 K/UL (ref 3.9–12.7)
WBC #/AREA URNS HPF: 3 /HPF (ref 0–5)

## 2024-03-17 PROCEDURE — 96361 HYDRATE IV INFUSION ADD-ON: CPT

## 2024-03-17 PROCEDURE — 63600175 PHARM REV CODE 636 W HCPCS: Performed by: NURSE PRACTITIONER

## 2024-03-17 PROCEDURE — 93010 ELECTROCARDIOGRAM REPORT: CPT | Mod: ,,, | Performed by: INTERNAL MEDICINE

## 2024-03-17 PROCEDURE — 93005 ELECTROCARDIOGRAM TRACING: CPT

## 2024-03-17 PROCEDURE — 96374 THER/PROPH/DIAG INJ IV PUSH: CPT

## 2024-03-17 PROCEDURE — 96365 THER/PROPH/DIAG IV INF INIT: CPT | Mod: 59

## 2024-03-17 PROCEDURE — 99285 EMERGENCY DEPT VISIT HI MDM: CPT | Mod: 25,27

## 2024-03-17 PROCEDURE — 63600175 PHARM REV CODE 636 W HCPCS: Performed by: PHYSICIAN ASSISTANT

## 2024-03-17 PROCEDURE — 25500020 PHARM REV CODE 255: Performed by: PHYSICIAN ASSISTANT

## 2024-03-17 PROCEDURE — 25000003 PHARM REV CODE 250: Performed by: PHYSICIAN ASSISTANT

## 2024-03-17 PROCEDURE — 80053 COMPREHEN METABOLIC PANEL: CPT | Performed by: PHYSICIAN ASSISTANT

## 2024-03-17 PROCEDURE — 94761 N-INVAS EAR/PLS OXIMETRY MLT: CPT

## 2024-03-17 PROCEDURE — 99284 EMERGENCY DEPT VISIT MOD MDM: CPT | Mod: 25

## 2024-03-17 PROCEDURE — 85025 COMPLETE CBC W/AUTO DIFF WBC: CPT | Performed by: PHYSICIAN ASSISTANT

## 2024-03-17 PROCEDURE — 96375 TX/PRO/DX INJ NEW DRUG ADDON: CPT

## 2024-03-17 PROCEDURE — G0378 HOSPITAL OBSERVATION PER HR: HCPCS

## 2024-03-17 PROCEDURE — 96376 TX/PRO/DX INJ SAME DRUG ADON: CPT

## 2024-03-17 PROCEDURE — 81000 URINALYSIS NONAUTO W/SCOPE: CPT | Performed by: PHYSICIAN ASSISTANT

## 2024-03-17 RX ORDER — ONDANSETRON HYDROCHLORIDE 2 MG/ML
4 INJECTION, SOLUTION INTRAVENOUS
Status: COMPLETED | OUTPATIENT
Start: 2024-03-17 | End: 2024-03-17

## 2024-03-17 RX ORDER — MORPHINE SULFATE 4 MG/ML
4 INJECTION, SOLUTION INTRAMUSCULAR; INTRAVENOUS EVERY 4 HOURS PRN
Status: DISCONTINUED | OUTPATIENT
Start: 2024-03-17 | End: 2024-03-19 | Stop reason: HOSPADM

## 2024-03-17 RX ORDER — DROPERIDOL 2.5 MG/ML
0.62 INJECTION, SOLUTION INTRAMUSCULAR; INTRAVENOUS ONCE
Status: COMPLETED | OUTPATIENT
Start: 2024-03-17 | End: 2024-03-17

## 2024-03-17 RX ORDER — DIPHENHYDRAMINE HYDROCHLORIDE 50 MG/ML
12.5 INJECTION INTRAMUSCULAR; INTRAVENOUS
Status: COMPLETED | OUTPATIENT
Start: 2024-03-17 | End: 2024-03-17

## 2024-03-17 RX ORDER — NALOXONE HCL 0.4 MG/ML
0.02 VIAL (ML) INJECTION
Status: DISCONTINUED | OUTPATIENT
Start: 2024-03-17 | End: 2024-03-19 | Stop reason: HOSPADM

## 2024-03-17 RX ORDER — KETOROLAC TROMETHAMINE 30 MG/ML
10 INJECTION, SOLUTION INTRAMUSCULAR; INTRAVENOUS
Status: COMPLETED | OUTPATIENT
Start: 2024-03-17 | End: 2024-03-17

## 2024-03-17 RX ORDER — ONDANSETRON 4 MG/1
4 TABLET, ORALLY DISINTEGRATING ORAL EVERY 8 HOURS PRN
Qty: 30 TABLET | Refills: 0 | Status: ON HOLD | OUTPATIENT
Start: 2024-03-17 | End: 2024-03-19 | Stop reason: HOSPADM

## 2024-03-17 RX ORDER — MORPHINE SULFATE 2 MG/ML
2 INJECTION, SOLUTION INTRAMUSCULAR; INTRAVENOUS ONCE
Status: COMPLETED | OUTPATIENT
Start: 2024-03-17 | End: 2024-03-17

## 2024-03-17 RX ORDER — GLUCAGON 1 MG
1 KIT INJECTION
Status: DISCONTINUED | OUTPATIENT
Start: 2024-03-17 | End: 2024-03-19 | Stop reason: HOSPADM

## 2024-03-17 RX ORDER — MORPHINE SULFATE 4 MG/ML
4 INJECTION, SOLUTION INTRAMUSCULAR; INTRAVENOUS
Status: COMPLETED | OUTPATIENT
Start: 2024-03-17 | End: 2024-03-17

## 2024-03-17 RX ORDER — IBUPROFEN 200 MG
24 TABLET ORAL
Status: DISCONTINUED | OUTPATIENT
Start: 2024-03-17 | End: 2024-03-19 | Stop reason: HOSPADM

## 2024-03-17 RX ORDER — ONDANSETRON HYDROCHLORIDE 2 MG/ML
4 INJECTION, SOLUTION INTRAVENOUS EVERY 6 HOURS PRN
Status: DISCONTINUED | OUTPATIENT
Start: 2024-03-17 | End: 2024-03-19 | Stop reason: HOSPADM

## 2024-03-17 RX ORDER — TALC
6 POWDER (GRAM) TOPICAL NIGHTLY PRN
Status: DISCONTINUED | OUTPATIENT
Start: 2024-03-17 | End: 2024-03-19 | Stop reason: HOSPADM

## 2024-03-17 RX ORDER — PROCHLORPERAZINE EDISYLATE 5 MG/ML
5 INJECTION INTRAMUSCULAR; INTRAVENOUS EVERY 6 HOURS PRN
Status: DISCONTINUED | OUTPATIENT
Start: 2024-03-17 | End: 2024-03-19 | Stop reason: HOSPADM

## 2024-03-17 RX ORDER — SODIUM CHLORIDE 0.9 % (FLUSH) 0.9 %
10 SYRINGE (ML) INJECTION EVERY 8 HOURS PRN
Status: DISCONTINUED | OUTPATIENT
Start: 2024-03-17 | End: 2024-03-19 | Stop reason: HOSPADM

## 2024-03-17 RX ORDER — IBUPROFEN 200 MG
16 TABLET ORAL
Status: DISCONTINUED | OUTPATIENT
Start: 2024-03-17 | End: 2024-03-19 | Stop reason: HOSPADM

## 2024-03-17 RX ORDER — ACETAMINOPHEN 325 MG/1
650 TABLET ORAL EVERY 8 HOURS PRN
Status: DISCONTINUED | OUTPATIENT
Start: 2024-03-17 | End: 2024-03-18

## 2024-03-17 RX ORDER — MORPHINE SULFATE 2 MG/ML
2 INJECTION, SOLUTION INTRAMUSCULAR; INTRAVENOUS EVERY 4 HOURS PRN
Status: DISCONTINUED | OUTPATIENT
Start: 2024-03-17 | End: 2024-03-19 | Stop reason: HOSPADM

## 2024-03-17 RX ORDER — IBUPROFEN 600 MG/1
600 TABLET ORAL EVERY 6 HOURS PRN
Qty: 20 TABLET | Refills: 0 | Status: ON HOLD | OUTPATIENT
Start: 2024-03-17 | End: 2024-03-19 | Stop reason: HOSPADM

## 2024-03-17 RX ADMIN — DROPERIDOL 0.62 MG: 2.5 INJECTION, SOLUTION INTRAMUSCULAR; INTRAVENOUS at 06:03

## 2024-03-17 RX ADMIN — MORPHINE SULFATE 4 MG: 4 INJECTION, SOLUTION INTRAMUSCULAR; INTRAVENOUS at 04:03

## 2024-03-17 RX ADMIN — SODIUM CHLORIDE 1000 ML: 9 INJECTION, SOLUTION INTRAVENOUS at 04:03

## 2024-03-17 RX ADMIN — ONDANSETRON 4 MG: 2 INJECTION INTRAMUSCULAR; INTRAVENOUS at 04:03

## 2024-03-17 RX ADMIN — MORPHINE SULFATE 4 MG: 4 INJECTION, SOLUTION INTRAMUSCULAR; INTRAVENOUS at 11:03

## 2024-03-17 RX ADMIN — DIPHENHYDRAMINE HYDROCHLORIDE 12.5 MG: 50 INJECTION, SOLUTION INTRAMUSCULAR; INTRAVENOUS at 06:03

## 2024-03-17 RX ADMIN — KETOROLAC TROMETHAMINE 10 MG: 30 INJECTION, SOLUTION INTRAMUSCULAR; INTRAVENOUS at 04:03

## 2024-03-17 RX ADMIN — IOHEXOL 75 ML: 350 INJECTION, SOLUTION INTRAVENOUS at 04:03

## 2024-03-17 RX ADMIN — MORPHINE SULFATE 2 MG: 2 INJECTION, SOLUTION INTRAMUSCULAR; INTRAVENOUS at 09:03

## 2024-03-17 RX ADMIN — PIPERACILLIN SODIUM AND TAZOBACTAM SODIUM 4.5 G: 4; .5 INJECTION, POWDER, LYOPHILIZED, FOR SOLUTION INTRAVENOUS at 04:03

## 2024-03-17 RX ADMIN — ONDANSETRON 4 MG: 2 INJECTION INTRAMUSCULAR; INTRAVENOUS at 11:03

## 2024-03-17 NOTE — HPI
Agusto Duckworth is a 24 year old with a past medical history of scoliosis who presented with left flank pain, nausea and vomiting that started this morning.  He denies trauma and has no urinary symptoms.  Patient states he took Aleve earlier today with minimal relief of his pain.  He states he started to feel nauseated with 1 episode of vomiting that he associates to taking NSAID on empty stomach.  Patient's started to alleviate while in the ED but then returned with associated nausea.    CMP and CBC performed in the ED unremarkable.  UA unremarkable.  Lumbar spine x-ray showed no acute fracture or dislocation.  CT abdomen pelvis revealed distended appendix with fluid and attenuating material concerning for early appendicitis.  ED provider spoke to Dr. Pierson, general surgery, who agreed to consult.  Patient was started on IV Zosyn and received IV morphine in the ED with some relief.  Patient referred to Hospital Medicine and will be placed in observation for further evaluation and management.

## 2024-03-17 NOTE — ED TRIAGE NOTES
Pt presents to the ER with complaints of lower back pain with nausea and vomiting that started this morning. Pt states pain radiates into the left flank area.

## 2024-03-17 NOTE — ED PROVIDER NOTES
"  Source of History:  Patient     Chief complaint:  Back Pain (Left lower back pain that started this morning. Denies recent injury, trauma to area, or changes in urinary habits. Hx of scoliosis. )      HPI:  Agusto Duckworth is a 24 y.o. male presenting with back pain.  Patient does report some pain radiation to the left flank.  He states this began this morning.  Denies any recent falls or injury.  Does state that he is known history of scoliosis and has chronic back pain however reports pain today was slightly different.  He denies any associated symptoms including  symptoms or change in stools.  States that he attempted Aleve earlier today.  He states shortly after taking a leave he began with some nausea and vomiting.  He associates this with taking the NSAID on empty stomach.       This is the extent to the patients complaints today here in the emergency department.    ROS: As per HPI     Review of patient's allergies indicates:  No Known Allergies    PMH:  As per HPI and below:  History reviewed. No pertinent past medical history.  History reviewed. No pertinent surgical history.    Social History     Tobacco Use    Smoking status: Never    Smokeless tobacco: Never   Substance Use Topics    Alcohol use: No     Comment: The patient is in the 7th grade.  He is here with his grandmother, whom he lives with.    Drug use: No       Physical Exam:    /69   Pulse 78   Temp 98 °F (36.7 °C)   Resp 18   Ht 6' 3" (1.905 m)   Wt 63.5 kg (140 lb)   SpO2 99%   BMI 17.50 kg/m²   Nursing note and vital signs reviewed.  Constitutional:  Appears slightly uncomfortable.  Emesis noted emesis bag.  Eyes: No conjunctival injection.  Extraocular muscles are intact.  ENT: Normal phonation.  Musculoskeletal:  TTP left lower paraspinal region SI region.  Some tenderness to palpation of the anterior iliac crest.  Abdomen:  Soft and nontender with no guarding, rebound or rigidity.  No CVA tenderness.  Skin: No rashes seen.  " Good turgor.  No abrasions.  No ecchymoses.  Psych: Appropriate, conversant.        I decided to obtain the patient's medical records.    Results for orders placed or performed during the hospital encounter of 03/17/24   Urinalysis, Reflex to Urine Culture Urine, Clean Catch    Specimen: Urine   Result Value Ref Range    Specimen UA Urine, Clean Catch     Color, UA Yellow Yellow, Straw, Meera    Appearance, UA Clear Clear    pH, UA 7.0 5.0 - 8.0    Specific Gravity, UA 1.030 1.005 - 1.030    Protein, UA 1+ (A) Negative    Glucose, UA Negative Negative    Ketones, UA Negative Negative    Bilirubin (UA) Negative Negative    Occult Blood UA Negative Negative    Nitrite, UA Negative Negative    Urobilinogen, UA 2.0-3.0 (A) <2.0 EU/dL    Leukocytes, UA Negative Negative   CBC auto differential   Result Value Ref Range    WBC 12.30 3.90 - 12.70 K/uL    RBC 5.23 4.60 - 6.20 M/uL    Hemoglobin 15.0 14.0 - 18.0 g/dL    Hematocrit 45.0 40.0 - 54.0 %    MCV 86 82 - 98 fL    MCH 28.7 27.0 - 31.0 pg    MCHC 33.3 32.0 - 36.0 g/dL    RDW 12.4 11.5 - 14.5 %    Platelets 331 150 - 450 K/uL    MPV 9.2 9.2 - 12.9 fL    Immature Granulocytes 0.4 0.0 - 0.5 %    Gran # (ANC) 10.6 (H) 1.8 - 7.7 K/uL    Immature Grans (Abs) 0.05 (H) 0.00 - 0.04 K/uL    Lymph # 1.0 1.0 - 4.8 K/uL    Mono # 0.6 0.3 - 1.0 K/uL    Eos # 0.1 0.0 - 0.5 K/uL    Baso # 0.04 0.00 - 0.20 K/uL    nRBC 0 0 /100 WBC    Gran % 86.1 (H) 38.0 - 73.0 %    Lymph % 7.8 (L) 18.0 - 48.0 %    Mono % 4.8 4.0 - 15.0 %    Eosinophil % 0.6 0.0 - 8.0 %    Basophil % 0.3 0.0 - 1.9 %    Differential Method Automated    Comprehensive metabolic panel   Result Value Ref Range    Sodium 140 136 - 145 mmol/L    Potassium 3.8 3.5 - 5.1 mmol/L    Chloride 105 95 - 110 mmol/L    CO2 24 23 - 29 mmol/L    Glucose 99 70 - 110 mg/dL    BUN 9 6 - 20 mg/dL    Creatinine 0.9 0.5 - 1.4 mg/dL    Calcium 9.6 8.7 - 10.5 mg/dL    Total Protein 8.3 6.0 - 8.4 g/dL    Albumin 4.8 3.5 - 5.2 g/dL    Total  Bilirubin 0.6 0.1 - 1.0 mg/dL    Alkaline Phosphatase 55 55 - 135 U/L    AST 15 10 - 40 U/L    ALT 12 10 - 44 U/L    eGFR >60 >60 mL/min/1.73 m^2    Anion Gap 11 8 - 16 mmol/L   Urinalysis Microscopic   Result Value Ref Range    RBC, UA 1 0 - 4 /hpf    WBC, UA 3 0 - 5 /hpf    Bacteria Rare None-Occ /hpf    Hyaline Casts, UA 0 0-1/lpf /lpf    Microscopic Comment SEE COMMENT      Imaging Results              X-Ray Lumbar Spine Ap And Lateral (Final result)  Result time 03/17/24 12:23:59      Final result by Kale Call MD (03/17/24 12:23:59)                   Impression:      1. No acute displaced fracture or dislocation of the lumbar spine.      Electronically signed by: Kale Call MD  Date:    03/17/2024  Time:    12:23               Narrative:    EXAMINATION:  XR LUMBAR SPINE AP AND LATERAL    CLINICAL HISTORY:  low back pain;    TECHNIQUE:  AP, lateral and spot images were performed of the lumbar spine.    COMPARISON:  01/09/2014, CT abdomen and pelvis 12/01/2022    FINDINGS:  Three views lumbar spine.    Lateral imaging demonstrates adequate alignment of the lumbar spine without significant vertebral body height loss or disc space height loss.  The facet joints are aligned.  The sacral coccygeal segments are aligned.  AP spinal alignment is remarkable for dextroscoliotic curvature.  The bilateral sacroiliac joints are intact.                                      MDM:    Agusto Duckworth 24 y.o. presented to the ED with c/o left flank pain.  Physical exam reveals nontoxic-appearing male in mild distress.  Initially with active vomiting.  Abdomen is soft and nontender.  TTP of the left anterior posterior iliac crest.  No true CVA tenderness.  No midline spinous tenderness.  Noted S shaped remain noted    Differential Diagnosis includes, but is not limited to:  AAA, aortic dissection, SBO/volvulus, intussusception, ileus, appendicitis, cholecystitis, hepatitis, nephrolithiasis, pancreatitis, IBD/IBS,  biliary colic, GERD, PUD, constipation, UTI/pyelonephritis, musculoskeletal pain.       ED management:  Plan for labs given the acute nature and presentation.  Did review patient's chart with very similar presentation 2 years ago with unremarkable workup and CT at that time.  Urine with no overt signs of infection.  No hematuria.  Given no true CVA tenderness did not feel he needed emergent imaging as low suspicion of acute intra-abdominal process.  No leukocytosis.  No KT.  On reassessment he would improvement in nausea and pain.  Was resting comfortably and felt comfortable going home.  X-ray was obtained given patient's history musculoskeletal component.  No significant acute abnormalities noted.  Continued curvature noted    Impression/Plan: Patient informed of diagnosis The primary encounter diagnosis was Left flank pain. Diagnoses of Scoliosis, unspecified scoliosis type, unspecified spinal region and Nausea and vomiting, unspecified vomiting type were also pertinent to this visit.  Patient will follow up with Primary.  Patient cautioned on when to return to ED.  Pt. Understands and agrees with current treatment plan       Diagnostic Impression:    1. Left flank pain    2. Scoliosis, unspecified scoliosis type, unspecified spinal region    3. Nausea and vomiting, unspecified vomiting type         ED Disposition Condition    Discharge Stable            ED Prescriptions       Medication Sig Dispense Start Date End Date Auth. Provider    ondansetron (ZOFRAN-ODT) 4 MG TbDL Take 1 tablet (4 mg total) by mouth every 8 (eight) hours as needed. 30 tablet 3/17/2024 -- Ariella Buenrostro PA    ibuprofen (ADVIL,MOTRIN) 600 MG tablet Take 1 tablet (600 mg total) by mouth every 6 (six) hours as needed. 20 tablet 3/17/2024 -- Ariella Buenrostro PA          Follow-up Information       Follow up With Specialties Details Why Contact Info    Yesenia Nelson MD Pediatrics Schedule an appointment as soon as possible for a  visit   [address]  [place] [address]  [phone]      [facility] Surgical Oncology, Orthopedic Surgery, Genetics, Physical Medicine and Rehabilitation, Occupational Therapy, Radiology Schedule an appointment as soon as possible for a visit   [address] [facility] [address] [facility] [address]  [phone]              Please pardon typos or dictation errors, as this note was transcribed using M*Modal fluency direct dictation software.      Ariella Buenrostro PA  03/17/24 1546

## 2024-03-17 NOTE — MEDICAL/APP STUDENT
"  History     Chief Complaint   Patient presents with    Back Pain     Left lower back pain that started this morning. Denies recent injury, trauma to area, or changes in urinary habits. Hx of scoliosis.      This is a 24-year-old male with PMHx of scoliosis presenting for urgent evaluation of left lower abdominal pain radiating to his lower back that started abruptly this morning. It has been constant and is worsening in severity. The pain feels dull in nature. He reports it is aggravated with positional changes and relieved with direct pressure to left lower abdomen. He had 2 episodes of vomiting this morning. Denies fever, chills, dysuria, hematuria, frequency, and urgency.     The history is provided by the patient. No  was used.     History reviewed. No pertinent past medical history.    History reviewed. No pertinent surgical history.    Family History   Problem Relation Age of Onset    Hypertension Maternal Grandmother     Diabetes Neg Hx        Social History     Tobacco Use    Smoking status: Never    Smokeless tobacco: Never   Substance Use Topics    Alcohol use: No     Comment: The patient is in the 7th grade.  He is here with his grandmother, whom he lives with.    Drug use: No       Review of Systems   Constitutional:  Negative for fever.   HENT:  Negative for sore throat.    Respiratory:  Negative for shortness of breath.    Cardiovascular:  Negative for chest pain.   Gastrointestinal:  Positive for abdominal pain and vomiting. Negative for nausea.   Genitourinary:  Negative for dysuria.   Musculoskeletal:  Positive for back pain.   Skin:  Negative for rash.   Neurological:  Negative for weakness.   Hematological:  Does not bruise/bleed easily.     Physical Exam   /68   Pulse 72   Temp 97.9 °F (36.6 °C) (Oral)   Resp 18   Ht 6' 3" (1.905 m)   Wt 63.5 kg (140 lb)   SpO2 99%   BMI 17.50 kg/m²     Physical Exam    Vitals reviewed.  Constitutional: He appears " well-developed and well-nourished.   HENT:   Head: Normocephalic and atraumatic.   Eyes: EOM are normal.   Neck:   Normal range of motion.  Cardiovascular:  Normal rate and regular rhythm.     Exam reveals no gallop and no friction rub.       No murmur heard.  Pulmonary/Chest: Breath sounds normal. No respiratory distress. He has no wheezes.   Abdominal: Abdomen is soft. Bowel sounds are normal. He exhibits no distension. There is no abdominal tenderness.   LLQ pain present at rest alleviated on palpation of LLQ.    No right CVA tenderness.  No left CVA tenderness. There is no rebound and no guarding.   Musculoskeletal:         General: Normal range of motion.      Cervical back: Normal and normal range of motion.      Thoracic back: No tenderness or bony tenderness. Normal range of motion. Scoliosis present.      Lumbar back: Tenderness present. No bony tenderness. Normal range of motion. Negative right straight leg raise test and negative left straight leg raise test. Scoliosis present.      Comments: Left lumbar paraspinal muscle tenderness     Neurological: He is alert.   Skin: Skin is warm and dry.   Psychiatric: He has a normal mood and affect.     ED Course

## 2024-03-17 NOTE — ED NOTES
LOC: The patient is awake, alert, and oriented to self, place, time, and situation. Pt is calm and cooperative. Affect is appropriate.  Speech is appropriate and clear.     APPEARANCE: Patient resting on stretcher; appears very uncomfortable. Patient is clean and well groomed.    SKIN: The skin is warm and dry; color consistent with ethnicity.  Patient has normal skin turgor and moist mucus membranes.  Skin intact; no breakdown or bruising noted.     MUSCULOSKELETAL: Patient moving upper and lower extremities without difficulty; denies pain in the extremities but reports pain in the lower back radiating into the left flank area.  Denies weakness.     RESPIRATORY: Airway is open and patent. Respirations spontaneous, even, easy, and non-labored.  Patient has a normal effort and rate.  No accessory muscle use noted. Denies cough.     CARDIAC:  Normal rate noted.  No peripheral edema noted. No complaints of chest pain.     ABDOMEN: Soft and non tender to palpation.  No distention noted. Pt denies abdominal pain; reports nausea and vomiting; denies diarrhea or constipation.    NEUROLOGIC: Eyes open spontaneously.  Behavior appropriate to situation.  Follows commands; facial expression symmetrical.  Purposeful motor response noted; normal sensation in all extremities. Pt denies headache; denies lightheadedness or dizziness; denies visual disturbances; denies loss of balance; denies unilateral weakness.

## 2024-03-17 NOTE — H&P
[facility] Medicine  History & Physical    Patient Name: Agusto Duckworth  MRN: [id number]  Patient Class: OP- Observation  Admission Date: 3/17/2024  Attending Physician: Darian Lee MD   Primary Care Provider: Yesenia Nelson MD         Patient information was obtained from patient, past medical records, and ER records.     Subjective:     Principal Problem:Acute appendicitis    Chief Complaint:   Chief Complaint   Patient presents with    Back Pain     Continued left back pain worsened since discharged. +nausea        HPI: Agusto Duckworth is a 24 year old with a past medical history of scoliosis who presented with left flank pain, nausea and vomiting that started this morning.  He denies trauma and has no urinary symptoms.  Patient states he took Aleve earlier today with minimal relief of his pain.  He states he started to feel nauseated with 1 episode of vomiting that he associates to taking NSAID on empty stomach.  Patient's started to alleviate while in the ED but then returned with associated nausea.    CMP and CBC performed in the ED unremarkable.  UA unremarkable.  Lumbar spine x-ray showed no acute fracture or dislocation.  CT abdomen pelvis revealed distended appendix with fluid and attenuating material concerning for early appendicitis.  ED provider spoke to Dr. Pierson, general surgery, who agreed to consult.  Patient was started on IV Zosyn and received IV morphine in the ED with some relief.  Patient referred to Hospital Medicine and will be placed in observation for further evaluation and management.       History reviewed. No pertinent past medical history.    History reviewed. No pertinent surgical history.    Review of patient's allergies indicates:  No Known Allergies    Current Facility-Administered Medications on File Prior to Encounter   Medication    [COMPLETED] ondansetron injection 4 mg     Current Outpatient Medications on File Prior to Encounter   Medication  Sig    AMNESTEEM 20 mg capsule TK 1 C PO BID    ibuprofen (ADVIL,MOTRIN) 600 MG tablet Take 1 tablet (600 mg total) by mouth every 6 (six) hours as needed.    ondansetron (ZOFRAN-ODT) 4 MG TbDL Take 1 tablet (4 mg total) by mouth every 8 (eight) hours as needed.    [DISCONTINUED] ondansetron (ZOFRAN-ODT) 4 MG TbDL Take 1 tablet (4 mg total) by mouth 2 (two) times daily.     Family History       Problem Relation (Age of Onset)    Hypertension Maternal Grandmother          Tobacco Use    Smoking status: Never    Smokeless tobacco: Never   Substance and Sexual Activity    Alcohol use: No    Drug use: Yes     Types: Marijuana    Sexual activity: Never     Review of Systems   Constitutional:  Negative for activity change, appetite change, chills and fever.   HENT:  Negative for congestion, sore throat and trouble swallowing.    Eyes:  Negative for photophobia and visual disturbance.   Respiratory:  Negative for cough, chest tightness and shortness of breath.    Cardiovascular:  Negative for chest pain, palpitations and leg swelling.   Gastrointestinal:  Positive for nausea and vomiting. Negative for abdominal pain and diarrhea.   Genitourinary:  Positive for flank pain. Negative for dysuria and hematuria.   Musculoskeletal:  Negative for back pain.   Neurological:  Negative for dizziness, weakness and headaches.   Psychiatric/Behavioral:  Negative for confusion.      Objective:     Vital Signs (Most Recent):  Temp: 98.3 °F (36.8 °C) (03/17/24 1541)  Pulse: 75 (03/17/24 1541)  Resp: 18 (03/17/24 1652)  SpO2: 99 % (03/17/24 1541) Vital Signs (24h Range):  Temp:  [97.9 °F (36.6 °C)-98.3 °F (36.8 °C)] 98.3 °F (36.8 °C)  Pulse:  [72-78] 75  Resp:  [18-20] 18  SpO2:  [99 %] 99 %  BP: (104-110)/(68-69) 110/69     Weight: 63.5 kg (140 lb)  Body mass index is 17.5 kg/m².     Physical Exam  Vitals reviewed.   Constitutional:       Appearance: Normal appearance. He is normal weight.   HENT:      Head: Normocephalic.       Mouth/Throat:      Mouth: Mucous membranes are moist.      Pharynx: Oropharynx is clear.   Eyes:      Pupils: Pupils are equal, round, and reactive to light.   Cardiovascular:      Rate and Rhythm: Normal rate and regular rhythm.      Pulses: Normal pulses.   Pulmonary:      Effort: Pulmonary effort is normal.      Breath sounds: Normal breath sounds.   Abdominal:      General: Bowel sounds are normal.   Musculoskeletal:         General: Normal range of motion.      Cervical back: Normal range of motion.      Thoracic back: Tenderness (left lower back) present.      Lumbar back: Scoliosis present.   Skin:     General: Skin is warm and dry.   Neurological:      Mental Status: He is alert and oriented to person, place, and time. Mental status is at baseline.   Psychiatric:         Mood and Affect: Mood normal.              CRANIAL NERVES     CN III, IV, VI   Pupils are equal, round, and reactive to light.       Significant Labs: All pertinent labs within the past 24 hours have been reviewed.  CBC:   Recent Labs   Lab 03/17/24  1142   WBC 12.30   HGB 15.0   HCT 45.0        CMP:   Recent Labs   Lab 03/17/24  1142      K 3.8      CO2 24   GLU 99   BUN 9   CREATININE 0.9   CALCIUM 9.6   PROT 8.3   ALBUMIN 4.8   BILITOT 0.6   ALKPHOS 55   AST 15   ALT 12   ANIONGAP 11       Significant Imaging: I have reviewed all pertinent imaging results/findings within the past 24 hours.  Imaging Results               CT Abdomen Pelvis With IV Contrast NO Oral Contrast (Final result)  Result time 03/17/24 16:24:47      Final result by Kale Call MD (03/17/24 16:24:47)                   Impression:      This report was flagged in Epic as abnormal.    1. The appendix is distended with fluid and high attenuating material.  There is slight Shantell appendiceal inflammation.  Early appendicitis is a consideration although correlation is needed as the appendix appeared prominent on the previous exam.  2. Urinary  bladder wall thickening, may be on the basis of nondistention however correlation with urinalysis recommended to exclude changes of cystitis.  3. Please see above for several additional findings.      Electronically signed by: Kale Call MD  Date:    03/17/2024  Time:    16:24               Narrative:    EXAMINATION:  CT ABDOMEN PELVIS WITH IV CONTRAST    CLINICAL HISTORY:  LLQ abdominal pain;    TECHNIQUE:  Low dose axial images, sagittal and coronal reformations were obtained from the lung bases to the pubic symphysis following the IV administration of 75 mL of Omnipaque 350 .  Oral contrast was not given.    COMPARISON:  12/01/2022    FINDINGS:  Images of the lower thorax are unremarkable.    The liver is prominent, correlation with LFTs recommended.  The spleen, pancreas, gallbladder and adrenal glands are grossly unremarkable.  There is no biliary dilation or ascites.  No significant abdominal lymphadenopathy.  The stomach is decompressed without wall thickening.    The kidneys enhance symmetrically without hydronephrosis or nephrolithiasis.  The bilateral ureters are unremarkable without calculi seen noting the ureters cannot be followed in their entirety to the urinary bladder.  The urinary bladder is nondistended noting there is some degree of wall thickening.  Correlation with urinalysis advised.  The prostate is not enlarged.  There is a small amount of fluid in the pelvis.    The large bowel is grossly unremarkable.  The terminal ileum is unremarkable.  The appendix is distended noting high attenuating material within the distal aspect, the appendix measures up to 1.3 cm noting slight Shantell appendiceal inflammation.  No discrete abscess at this time.  The small bowel is grossly unremarkable.  No free air.  There are several scattered shotty periaortic, pericaval, and mesenteric lymph nodes.  No focal organized pelvic fluid collection.    No acute osseous abnormality.                                       Assessment/Plan:     * Acute appendicitis  Patient with left lower back pain with associated nausea and vomiting.  CTA showed findings concerning for acute appendicitis.    -general surgery, Dr. Pierson, consulted and plan for OR tomorrow  -continue IV Zosyn  -IV morphine as needed for pain  -monitor CBC and CMP      Adolescent idiopathic scoliosis of thoracic region  History noted.        VTE Risk Mitigation (From admission, onward)           Ordered     IP VTE LOW RISK PATIENT  Once         03/17/24 1715     Place sequential compression device  Until discontinued         03/17/24 1715                         On 03/17/2024, patient should be placed in hospital observation services          Reshma Leon NP  Department of Hospital Medicine  [facility] - Emergency Dept

## 2024-03-17 NOTE — ED PROVIDER NOTES
"     Source of History:  Patient    Chief complaint:  Back Pain (Continued left back pain worsened since discharged. +nausea)      HPI:  Agusto Duckworth is a 24 y.o. male presenting with recurrent back and left flank pain.  Patient is known to me as he was seen earlier in the ER by myself for similar presentation.  He states upon leaving pain returned and he began again with nausea denies any vomiting.  He did have successful p.o. challenge in the ER earlier.  Denies any additional new symptoms.  Not tried any analgesics and received only Zofran with last ER visit.    This is the extent to the patients complaints today here in the emergency department.    ROS: As per HPI     Review of patient's allergies indicates:  No Known Allergies    PMH:  As per HPI and below:  History reviewed. No pertinent past medical history.  History reviewed. No pertinent surgical history.    Social History     Tobacco Use    Smoking status: Never    Smokeless tobacco: Never   Substance Use Topics    Alcohol use: No    Drug use: Yes     Types: Marijuana       Physical Exam:    Pulse 75   Temp 98.3 °F (36.8 °C) (Oral)   Resp 20   Ht 6' 3" (1.905 m)   Wt 63.5 kg (140 lb)   SpO2 99%   BMI 17.50 kg/m²   Nursing note and vital signs reviewed.  Constitutional:  He appears uncomfortable Nontoxic  Eyes: No conjunctival injection.Extraocular muscles are intact.  ENT: Oropharynx clear.  Normal phonation.   Cardiovascular: Regular rate and rhythm.  No murmurs. No gallops. No rubs  Respiratory: Clear to auscultation bilaterally.  Good air movement.  No wheezes.  No rhonchi. No rales. No accessory muscle use..  Abdomen: Soft.  Generalized tenderness to palpation.  Some voluntary guarding.  No overt McBurney point tenderness.  Reports some slight tenderness of the left lower quadrant.  CVA tenderness  Musculoskeletal: Good range of motion all joints.  No deformities.  Neck supple.  No meningismus.  Skin: No rashes seen.  Good turgor.  No " abrasions.  No ecchymoses.  Neurologic: Motor intact.  Sensation intact.  No ataxia. No focal neurological deficits.  Psych: Appropriate, conversant    Labs that have been ordered have been independently reviewed and interpreted by myself.    I decided to obtain the patient's medical records.  Critical Care    Date/Time: 3/17/2024 2:07 PM    Performed by: Ariella Buenrostro PA  Authorized by: Oswald Betancourt MD  Direct patient critical care time: 10 minutes  Additional history critical care time: 10 minutes  Ordering / reviewing critical care time: 5 minutes  Documentation critical care time: 5 minutes  Consulting other physicians critical care time: 5 minutes  Total critical care time (exclusive of procedural time) : 35 minutes        MDM/ Differential Dx:    Agusto Duckworth 24 y.o. presented to the ED with c/o left flank and back pain. Physical exam reveals uncomfortable appearing male with recurrent left-sided back and flank pain.  Exam notable for generalized tenderness palpation of abdomen however no McBurney point tenderness.  Reports pain relieved with palpation of the left lower abdomen    DDX:  Nephrolithiasis, ureterolithiasis, renal colic, enteritis, musculoskeletal pain, viral syndrome, appendicitis    ED management:  Given the recurrent nature of pain and unremarkable labs with slight left lower quadrant involvement will obtain CT abdomen.  CT reveals concerns for early appendicitis.  Suspect early presentation and discuss with general surgery who recommends admission hospital medicine, empiric antibiotics and analgesics.  On reassessment no improvement with Toradol.  Morphine ordered.  Slight improvement however as he returns with pain will add additional analgesic/antiemetic    Impression/Plan: Patient informed of diagnosis  The primary encounter diagnosis was Left flank pain. Diagnoses of Acute appendicitis with localized peritonitis, without perforation, abscess, or gangrene, Acute appendicitis,  Chest pain, and Nausea & vomiting were also pertinent to this visit.  I discussed with Hospital Medicine who will admit the patient to the service    EKG with QTC.  Normal sinus rhythm at rate 62.  No STEMI.  After administration of droperidol and Benadryl resting comfortably with improved pain      Results for orders placed or performed during the hospital encounter of 03/17/24   Urinalysis, Reflex to Urine Culture Urine, Clean Catch    Specimen: Urine   Result Value Ref Range    Specimen UA Urine, Clean Catch     Color, UA Yellow Yellow, Straw, Meera    Appearance, UA Clear Clear    pH, UA 7.0 5.0 - 8.0    Specific Gravity, UA 1.030 1.005 - 1.030    Protein, UA 1+ (A) Negative    Glucose, UA Negative Negative    Ketones, UA Negative Negative    Bilirubin (UA) Negative Negative    Occult Blood UA Negative Negative    Nitrite, UA Negative Negative    Urobilinogen, UA 2.0-3.0 (A) <2.0 EU/dL    Leukocytes, UA Negative Negative   CBC auto differential   Result Value Ref Range    WBC 12.30 3.90 - 12.70 K/uL    RBC 5.23 4.60 - 6.20 M/uL    Hemoglobin 15.0 14.0 - 18.0 g/dL    Hematocrit 45.0 40.0 - 54.0 %    MCV 86 82 - 98 fL    MCH 28.7 27.0 - 31.0 pg    MCHC 33.3 32.0 - 36.0 g/dL    RDW 12.4 11.5 - 14.5 %    Platelets 331 150 - 450 K/uL    MPV 9.2 9.2 - 12.9 fL    Immature Granulocytes 0.4 0.0 - 0.5 %    Gran # (ANC) 10.6 (H) 1.8 - 7.7 K/uL    Immature Grans (Abs) 0.05 (H) 0.00 - 0.04 K/uL    Lymph # 1.0 1.0 - 4.8 K/uL    Mono # 0.6 0.3 - 1.0 K/uL    Eos # 0.1 0.0 - 0.5 K/uL    Baso # 0.04 0.00 - 0.20 K/uL    nRBC 0 0 /100 WBC    Gran % 86.1 (H) 38.0 - 73.0 %    Lymph % 7.8 (L) 18.0 - 48.0 %    Mono % 4.8 4.0 - 15.0 %    Eosinophil % 0.6 0.0 - 8.0 %    Basophil % 0.3 0.0 - 1.9 %    Differential Method Automated    Comprehensive metabolic panel   Result Value Ref Range    Sodium 140 136 - 145 mmol/L    Potassium 3.8 3.5 - 5.1 mmol/L    Chloride 105 95 - 110 mmol/L    CO2 24 23 - 29 mmol/L    Glucose 99 70 - 110 mg/dL     BUN 9 6 - 20 mg/dL    Creatinine 0.9 0.5 - 1.4 mg/dL    Calcium 9.6 8.7 - 10.5 mg/dL    Total Protein 8.3 6.0 - 8.4 g/dL    Albumin 4.8 3.5 - 5.2 g/dL    Total Bilirubin 0.6 0.1 - 1.0 mg/dL    Alkaline Phosphatase 55 55 - 135 U/L    AST 15 10 - 40 U/L    ALT 12 10 - 44 U/L    eGFR >60 >60 mL/min/1.73 m^2    Anion Gap 11 8 - 16 mmol/L   Urinalysis Microscopic   Result Value Ref Range    RBC, UA 1 0 - 4 /hpf    WBC, UA 3 0 - 5 /hpf    Bacteria Rare None-Occ /hpf    Hyaline Casts, UA 0 0-1/lpf /lpf    Microscopic Comment SEE COMMENT      Imaging Results               CT Abdomen Pelvis With IV Contrast NO Oral Contrast (Final result)  Result time 03/17/24 16:24:47      Final result by Kale Call MD (03/17/24 16:24:47)                   Impression:      This report was flagged in Epic as abnormal.    1. The appendix is distended with fluid and high attenuating material.  There is slight Shantell appendiceal inflammation.  Early appendicitis is a consideration although correlation is needed as the appendix appeared prominent on the previous exam.  2. Urinary bladder wall thickening, may be on the basis of nondistention however correlation with urinalysis recommended to exclude changes of cystitis.  3. Please see above for several additional findings.      Electronically signed by: Kale Call MD  Date:    03/17/2024  Time:    16:24               Narrative:    EXAMINATION:  CT ABDOMEN PELVIS WITH IV CONTRAST    CLINICAL HISTORY:  LLQ abdominal pain;    TECHNIQUE:  Low dose axial images, sagittal and coronal reformations were obtained from the lung bases to the pubic symphysis following the IV administration of 75 mL of Omnipaque 350 .  Oral contrast was not given.    COMPARISON:  12/01/2022    FINDINGS:  Images of the lower thorax are unremarkable.    The liver is prominent, correlation with LFTs recommended.  The spleen, pancreas, gallbladder and adrenal glands are grossly unremarkable.  There is no biliary  dilation or ascites.  No significant abdominal lymphadenopathy.  The stomach is decompressed without wall thickening.    The kidneys enhance symmetrically without hydronephrosis or nephrolithiasis.  The bilateral ureters are unremarkable without calculi seen noting the ureters cannot be followed in their entirety to the urinary bladder.  The urinary bladder is nondistended noting there is some degree of wall thickening.  Correlation with urinalysis advised.  The prostate is not enlarged.  There is a small amount of fluid in the pelvis.    The large bowel is grossly unremarkable.  The terminal ileum is unremarkable.  The appendix is distended noting high attenuating material within the distal aspect, the appendix measures up to 1.3 cm noting slight Shantell appendiceal inflammation.  No discrete abscess at this time.  The small bowel is grossly unremarkable.  No free air.  There are several scattered shotty periaortic, pericaval, and mesenteric lymph nodes.  No focal organized pelvic fluid collection.    No acute osseous abnormality.                                                Diagnostic Impression:    No diagnosis found.              Ariella Buenrostro, PA  03/17/24 1730       Ariella Buenrostro, PA  03/17/24 1806       Ariella Buenrostro, PA  04/23/24 1108

## 2024-03-17 NOTE — ED NOTES
Pt resting quietly on stretcher; reporting significant relief in nausea since receiving medication. Pt updated on wait time for test results.  Bed locked in lowest position; side rails up and locked x 2; call light, bedside table, and personal belongings within reach.  Will continue to monitor pt.

## 2024-03-17 NOTE — ASSESSMENT & PLAN NOTE
Patient with left lower back pain with associated nausea and vomiting.  CTA showed findings concerning for acute appendicitis.    -general surgery, Dr. Pierson, consulted and plan for OR tomorrow  -continue IV Zosyn  -IV morphine as needed for pain  -monitor CBC and CMP

## 2024-03-17 NOTE — SUBJECTIVE & OBJECTIVE
History reviewed. No pertinent past medical history.    History reviewed. No pertinent surgical history.    Review of patient's allergies indicates:  No Known Allergies    Current Facility-Administered Medications on File Prior to Encounter   Medication    [COMPLETED] ondansetron injection 4 mg     Current Outpatient Medications on File Prior to Encounter   Medication Sig    AMNESTEEM 20 mg capsule TK 1 C PO BID    ibuprofen (ADVIL,MOTRIN) 600 MG tablet Take 1 tablet (600 mg total) by mouth every 6 (six) hours as needed.    ondansetron (ZOFRAN-ODT) 4 MG TbDL Take 1 tablet (4 mg total) by mouth every 8 (eight) hours as needed.    [DISCONTINUED] ondansetron (ZOFRAN-ODT) 4 MG TbDL Take 1 tablet (4 mg total) by mouth 2 (two) times daily.     Family History       Problem Relation (Age of Onset)    Hypertension Maternal Grandmother          Tobacco Use    Smoking status: Never    Smokeless tobacco: Never   Substance and Sexual Activity    Alcohol use: No    Drug use: Yes     Types: Marijuana    Sexual activity: Never     Review of Systems   Constitutional:  Negative for activity change, appetite change, chills and fever.   HENT:  Negative for congestion, sore throat and trouble swallowing.    Eyes:  Negative for photophobia and visual disturbance.   Respiratory:  Negative for cough, chest tightness and shortness of breath.    Cardiovascular:  Negative for chest pain, palpitations and leg swelling.   Gastrointestinal:  Positive for nausea and vomiting. Negative for abdominal pain and diarrhea.   Genitourinary:  Positive for flank pain. Negative for dysuria and hematuria.   Musculoskeletal:  Negative for back pain.   Neurological:  Negative for dizziness, weakness and headaches.   Psychiatric/Behavioral:  Negative for confusion.      Objective:     Vital Signs (Most Recent):  Temp: 98.3 °F (36.8 °C) (03/17/24 1541)  Pulse: 75 (03/17/24 1541)  Resp: 18 (03/17/24 1652)  SpO2: 99 % (03/17/24 1541) Vital Signs (24h  Range):  Temp:  [97.9 °F (36.6 °C)-98.3 °F (36.8 °C)] 98.3 °F (36.8 °C)  Pulse:  [72-78] 75  Resp:  [18-20] 18  SpO2:  [99 %] 99 %  BP: (104-110)/(68-69) 110/69     Weight: 63.5 kg (140 lb)  Body mass index is 17.5 kg/m².     Physical Exam  Vitals reviewed.   Constitutional:       Appearance: Normal appearance. He is normal weight.   HENT:      Head: Normocephalic.      Mouth/Throat:      Mouth: Mucous membranes are moist.      Pharynx: Oropharynx is clear.   Eyes:      Pupils: Pupils are equal, round, and reactive to light.   Cardiovascular:      Rate and Rhythm: Normal rate and regular rhythm.      Pulses: Normal pulses.   Pulmonary:      Effort: Pulmonary effort is normal.      Breath sounds: Normal breath sounds.   Abdominal:      General: Bowel sounds are normal.   Musculoskeletal:         General: Normal range of motion.      Cervical back: Normal range of motion.      Thoracic back: Tenderness (left lower back) present.      Lumbar back: Scoliosis present.   Skin:     General: Skin is warm and dry.   Neurological:      Mental Status: He is alert and oriented to person, place, and time. Mental status is at baseline.   Psychiatric:         Mood and Affect: Mood normal.              CRANIAL NERVES     CN III, IV, VI   Pupils are equal, round, and reactive to light.       Significant Labs: All pertinent labs within the past 24 hours have been reviewed.  CBC:   Recent Labs   Lab 03/17/24  1142   WBC 12.30   HGB 15.0   HCT 45.0        CMP:   Recent Labs   Lab 03/17/24  1142      K 3.8      CO2 24   GLU 99   BUN 9   CREATININE 0.9   CALCIUM 9.6   PROT 8.3   ALBUMIN 4.8   BILITOT 0.6   ALKPHOS 55   AST 15   ALT 12   ANIONGAP 11       Significant Imaging: I have reviewed all pertinent imaging results/findings within the past 24 hours.  Imaging Results               CT Abdomen Pelvis With IV Contrast NO Oral Contrast (Final result)  Result time 03/17/24 16:24:47      Final result by Kale Call  MD EDWAR (03/17/24 16:24:47)                   Impression:      This report was flagged in Epic as abnormal.    1. The appendix is distended with fluid and high attenuating material.  There is slight Shantell appendiceal inflammation.  Early appendicitis is a consideration although correlation is needed as the appendix appeared prominent on the previous exam.  2. Urinary bladder wall thickening, may be on the basis of nondistention however correlation with urinalysis recommended to exclude changes of cystitis.  3. Please see above for several additional findings.      Electronically signed by: Kale Call MD  Date:    03/17/2024  Time:    16:24               Narrative:    EXAMINATION:  CT ABDOMEN PELVIS WITH IV CONTRAST    CLINICAL HISTORY:  LLQ abdominal pain;    TECHNIQUE:  Low dose axial images, sagittal and coronal reformations were obtained from the lung bases to the pubic symphysis following the IV administration of 75 mL of Omnipaque 350 .  Oral contrast was not given.    COMPARISON:  12/01/2022    FINDINGS:  Images of the lower thorax are unremarkable.    The liver is prominent, correlation with LFTs recommended.  The spleen, pancreas, gallbladder and adrenal glands are grossly unremarkable.  There is no biliary dilation or ascites.  No significant abdominal lymphadenopathy.  The stomach is decompressed without wall thickening.    The kidneys enhance symmetrically without hydronephrosis or nephrolithiasis.  The bilateral ureters are unremarkable without calculi seen noting the ureters cannot be followed in their entirety to the urinary bladder.  The urinary bladder is nondistended noting there is some degree of wall thickening.  Correlation with urinalysis advised.  The prostate is not enlarged.  There is a small amount of fluid in the pelvis.    The large bowel is grossly unremarkable.  The terminal ileum is unremarkable.  The appendix is distended noting high attenuating material within the distal aspect,  the appendix measures up to 1.3 cm noting slight Shantell appendiceal inflammation.  No discrete abscess at this time.  The small bowel is grossly unremarkable.  No free air.  There are several scattered shotty periaortic, pericaval, and mesenteric lymph nodes.  No focal organized pelvic fluid collection.    No acute osseous abnormality.

## 2024-03-17 NOTE — Clinical Note
"Agusto Houserik" Gucci was seen and treated in our emergency department on 3/17/2024.  He may return to work on 03/20/2024.       If you have any questions or concerns, please don't hesitate to call.      Ariella Buenrostro PA"

## 2024-03-18 ENCOUNTER — ANESTHESIA EVENT (OUTPATIENT)
Dept: SURGERY | Facility: OTHER | Age: 24
End: 2024-03-18
Payer: MEDICAID

## 2024-03-18 ENCOUNTER — ANESTHESIA (OUTPATIENT)
Dept: SURGERY | Facility: OTHER | Age: 24
End: 2024-03-18
Payer: MEDICAID

## 2024-03-18 PROBLEM — A41.9 SEPSIS: Status: ACTIVE | Noted: 2024-03-18

## 2024-03-18 LAB
ANION GAP SERPL CALC-SCNC: 10 MMOL/L (ref 8–16)
BASOPHILS # BLD AUTO: 0.05 K/UL (ref 0–0.2)
BASOPHILS NFR BLD: 0.2 % (ref 0–1.9)
BUN SERPL-MCNC: 6 MG/DL (ref 6–20)
CALCIUM SERPL-MCNC: 9.2 MG/DL (ref 8.7–10.5)
CHLORIDE SERPL-SCNC: 103 MMOL/L (ref 95–110)
CO2 SERPL-SCNC: 22 MMOL/L (ref 23–29)
CREAT SERPL-MCNC: 0.8 MG/DL (ref 0.5–1.4)
DIFFERENTIAL METHOD BLD: ABNORMAL
EOSINOPHIL # BLD AUTO: 0 K/UL (ref 0–0.5)
EOSINOPHIL NFR BLD: 0 % (ref 0–8)
ERYTHROCYTE [DISTWIDTH] IN BLOOD BY AUTOMATED COUNT: 12.3 % (ref 11.5–14.5)
EST. GFR  (NO RACE VARIABLE): >60 ML/MIN/1.73 M^2
GLUCOSE SERPL-MCNC: 101 MG/DL (ref 70–110)
HCT VFR BLD AUTO: 40.7 % (ref 40–54)
HGB BLD-MCNC: 13.8 G/DL (ref 14–18)
IMM GRANULOCYTES # BLD AUTO: 0.15 K/UL (ref 0–0.04)
IMM GRANULOCYTES NFR BLD AUTO: 0.6 % (ref 0–0.5)
LACTATE SERPL-SCNC: 1.2 MMOL/L (ref 0.5–2.2)
LACTATE SERPL-SCNC: 1.7 MMOL/L (ref 0.5–2.2)
LYMPHOCYTES # BLD AUTO: 0.7 K/UL (ref 1–4.8)
LYMPHOCYTES NFR BLD: 2.9 % (ref 18–48)
MCH RBC QN AUTO: 28.9 PG (ref 27–31)
MCHC RBC AUTO-ENTMCNC: 33.9 G/DL (ref 32–36)
MCV RBC AUTO: 85 FL (ref 82–98)
MONOCYTES # BLD AUTO: 2.4 K/UL (ref 0.3–1)
MONOCYTES NFR BLD: 10.2 % (ref 4–15)
NEUTROPHILS # BLD AUTO: 20.5 K/UL (ref 1.8–7.7)
NEUTROPHILS NFR BLD: 86.1 % (ref 38–73)
NRBC BLD-RTO: 0 /100 WBC
OHS QRS DURATION: 92 MS
OHS QTC CALCULATION: 393 MS
PLATELET # BLD AUTO: 300 K/UL (ref 150–450)
PLATELET BLD QL SMEAR: ABNORMAL
PMV BLD AUTO: 9 FL (ref 9.2–12.9)
POTASSIUM SERPL-SCNC: 3.6 MMOL/L (ref 3.5–5.1)
RBC # BLD AUTO: 4.78 M/UL (ref 4.6–6.2)
SODIUM SERPL-SCNC: 135 MMOL/L (ref 136–145)
WBC # BLD AUTO: 23.86 K/UL (ref 3.9–12.7)

## 2024-03-18 PROCEDURE — 63600175 PHARM REV CODE 636 W HCPCS: Performed by: ANESTHESIOLOGY

## 2024-03-18 PROCEDURE — 25000003 PHARM REV CODE 250

## 2024-03-18 PROCEDURE — 99204 OFFICE O/P NEW MOD 45 MIN: CPT | Mod: 57,,, | Performed by: SURGERY

## 2024-03-18 PROCEDURE — 27201423 OPTIME MED/SURG SUP & DEVICES STERILE SUPPLY: Performed by: SURGERY

## 2024-03-18 PROCEDURE — 96376 TX/PRO/DX INJ SAME DRUG ADON: CPT

## 2024-03-18 PROCEDURE — 37000009 HC ANESTHESIA EA ADD 15 MINS: Performed by: SURGERY

## 2024-03-18 PROCEDURE — 36415 COLL VENOUS BLD VENIPUNCTURE: CPT | Performed by: NURSE PRACTITIONER

## 2024-03-18 PROCEDURE — 94761 N-INVAS EAR/PLS OXIMETRY MLT: CPT

## 2024-03-18 PROCEDURE — 37000008 HC ANESTHESIA 1ST 15 MINUTES: Performed by: SURGERY

## 2024-03-18 PROCEDURE — 25000003 PHARM REV CODE 250: Performed by: HOSPITALIST

## 2024-03-18 PROCEDURE — 64488 TAP BLOCK BI INJECTION: CPT | Mod: 59,,, | Performed by: ANESTHESIOLOGY

## 2024-03-18 PROCEDURE — 63600175 PHARM REV CODE 636 W HCPCS: Performed by: HOSPITALIST

## 2024-03-18 PROCEDURE — C9290 INJ, BUPIVACAINE LIPOSOME: HCPCS | Performed by: ANESTHESIOLOGY

## 2024-03-18 PROCEDURE — 80048 BASIC METABOLIC PNL TOTAL CA: CPT | Performed by: NURSE PRACTITIONER

## 2024-03-18 PROCEDURE — 63600175 PHARM REV CODE 636 W HCPCS: Mod: JZ,JG | Performed by: SURGERY

## 2024-03-18 PROCEDURE — 85025 COMPLETE CBC W/AUTO DIFF WBC: CPT | Performed by: NURSE PRACTITIONER

## 2024-03-18 PROCEDURE — 63600175 PHARM REV CODE 636 W HCPCS

## 2024-03-18 PROCEDURE — 96361 HYDRATE IV INFUSION ADD-ON: CPT | Mod: 59

## 2024-03-18 PROCEDURE — 25000003 PHARM REV CODE 250: Performed by: NURSE PRACTITIONER

## 2024-03-18 PROCEDURE — 36415 COLL VENOUS BLD VENIPUNCTURE: CPT | Mod: XB | Performed by: HOSPITALIST

## 2024-03-18 PROCEDURE — 71000033 HC RECOVERY, INTIAL HOUR: Performed by: SURGERY

## 2024-03-18 PROCEDURE — 71000039 HC RECOVERY, EACH ADD'L HOUR: Performed by: SURGERY

## 2024-03-18 PROCEDURE — 83605 ASSAY OF LACTIC ACID: CPT | Performed by: HOSPITALIST

## 2024-03-18 PROCEDURE — 96366 THER/PROPH/DIAG IV INF ADDON: CPT

## 2024-03-18 PROCEDURE — 63600175 PHARM REV CODE 636 W HCPCS: Performed by: NURSE PRACTITIONER

## 2024-03-18 PROCEDURE — D9220A PRA ANESTHESIA: Mod: CRNA,,,

## 2024-03-18 PROCEDURE — G0378 HOSPITAL OBSERVATION PER HR: HCPCS

## 2024-03-18 PROCEDURE — 87040 BLOOD CULTURE FOR BACTERIA: CPT | Mod: 59 | Performed by: HOSPITALIST

## 2024-03-18 PROCEDURE — 36000708 HC OR TIME LEV III 1ST 15 MIN: Performed by: SURGERY

## 2024-03-18 PROCEDURE — 36000709 HC OR TIME LEV III EA ADD 15 MIN: Performed by: SURGERY

## 2024-03-18 PROCEDURE — 64488 TAP BLOCK BI INJECTION: CPT | Mod: 59 | Performed by: ANESTHESIOLOGY

## 2024-03-18 PROCEDURE — D9220A PRA ANESTHESIA: Mod: ANES,,, | Performed by: ANESTHESIOLOGY

## 2024-03-18 PROCEDURE — 44970 LAPAROSCOPY APPENDECTOMY: CPT | Mod: ,,, | Performed by: SURGERY

## 2024-03-18 PROCEDURE — 88304 TISSUE EXAM BY PATHOLOGIST: CPT | Mod: 26,,, | Performed by: PATHOLOGY

## 2024-03-18 PROCEDURE — 88304 TISSUE EXAM BY PATHOLOGIST: CPT | Performed by: PATHOLOGY

## 2024-03-18 RX ORDER — SODIUM CHLORIDE, SODIUM LACTATE, POTASSIUM CHLORIDE, CALCIUM CHLORIDE 600; 310; 30; 20 MG/100ML; MG/100ML; MG/100ML; MG/100ML
INJECTION, SOLUTION INTRAVENOUS CONTINUOUS
Status: DISCONTINUED | OUTPATIENT
Start: 2024-03-18 | End: 2024-03-19 | Stop reason: HOSPADM

## 2024-03-18 RX ORDER — MIDAZOLAM HYDROCHLORIDE 1 MG/ML
INJECTION INTRAMUSCULAR; INTRAVENOUS
Status: DISCONTINUED | OUTPATIENT
Start: 2024-03-18 | End: 2024-03-18

## 2024-03-18 RX ORDER — ACETAMINOPHEN 500 MG
1000 TABLET ORAL EVERY 8 HOURS PRN
Status: DISCONTINUED | OUTPATIENT
Start: 2024-03-18 | End: 2024-03-19 | Stop reason: HOSPADM

## 2024-03-18 RX ORDER — LIDOCAINE HYDROCHLORIDE 20 MG/ML
INJECTION INTRAVENOUS
Status: DISCONTINUED | OUTPATIENT
Start: 2024-03-18 | End: 2024-03-18

## 2024-03-18 RX ORDER — DEXAMETHASONE SODIUM PHOSPHATE 4 MG/ML
INJECTION, SOLUTION INTRA-ARTICULAR; INTRALESIONAL; INTRAMUSCULAR; INTRAVENOUS; SOFT TISSUE
Status: DISCONTINUED | OUTPATIENT
Start: 2024-03-18 | End: 2024-03-18

## 2024-03-18 RX ORDER — ONDANSETRON HYDROCHLORIDE 2 MG/ML
INJECTION, SOLUTION INTRAVENOUS
Status: DISCONTINUED | OUTPATIENT
Start: 2024-03-18 | End: 2024-03-18

## 2024-03-18 RX ORDER — OXYCODONE HYDROCHLORIDE 5 MG/1
5 TABLET ORAL EVERY 4 HOURS PRN
Status: DISCONTINUED | OUTPATIENT
Start: 2024-03-18 | End: 2024-03-19

## 2024-03-18 RX ORDER — FENTANYL CITRATE 50 UG/ML
INJECTION, SOLUTION INTRAMUSCULAR; INTRAVENOUS
Status: DISCONTINUED | OUTPATIENT
Start: 2024-03-18 | End: 2024-03-18

## 2024-03-18 RX ORDER — DIPHENHYDRAMINE HYDROCHLORIDE 50 MG/ML
12.5 INJECTION INTRAMUSCULAR; INTRAVENOUS EVERY 30 MIN PRN
Status: DISCONTINUED | OUTPATIENT
Start: 2024-03-18 | End: 2024-03-19 | Stop reason: HOSPADM

## 2024-03-18 RX ORDER — BUPIVACAINE HYDROCHLORIDE 2.5 MG/ML
INJECTION, SOLUTION EPIDURAL; INFILTRATION; INTRACAUDAL
Status: COMPLETED | OUTPATIENT
Start: 2024-03-18 | End: 2024-03-18

## 2024-03-18 RX ORDER — BUPIVACAINE HYDROCHLORIDE 2.5 MG/ML
INJECTION, SOLUTION EPIDURAL; INFILTRATION; INTRACAUDAL
Status: DISCONTINUED | OUTPATIENT
Start: 2024-03-18 | End: 2024-03-18 | Stop reason: HOSPADM

## 2024-03-18 RX ORDER — KETOROLAC TROMETHAMINE 30 MG/ML
INJECTION, SOLUTION INTRAMUSCULAR; INTRAVENOUS
Status: DISCONTINUED | OUTPATIENT
Start: 2024-03-18 | End: 2024-03-18

## 2024-03-18 RX ORDER — SODIUM CHLORIDE 0.9 % (FLUSH) 0.9 %
3 SYRINGE (ML) INJECTION
Status: DISCONTINUED | OUTPATIENT
Start: 2024-03-18 | End: 2024-03-19 | Stop reason: HOSPADM

## 2024-03-18 RX ORDER — PROCHLORPERAZINE EDISYLATE 5 MG/ML
5 INJECTION INTRAMUSCULAR; INTRAVENOUS EVERY 30 MIN PRN
Status: DISCONTINUED | OUTPATIENT
Start: 2024-03-18 | End: 2024-03-19

## 2024-03-18 RX ORDER — HYDROMORPHONE HYDROCHLORIDE 2 MG/ML
0.4 INJECTION, SOLUTION INTRAMUSCULAR; INTRAVENOUS; SUBCUTANEOUS EVERY 5 MIN PRN
Status: DISCONTINUED | OUTPATIENT
Start: 2024-03-18 | End: 2024-03-19

## 2024-03-18 RX ORDER — PHENYLEPHRINE HYDROCHLORIDE 10 MG/ML
INJECTION INTRAVENOUS
Status: DISCONTINUED | OUTPATIENT
Start: 2024-03-18 | End: 2024-03-18

## 2024-03-18 RX ORDER — PROPOFOL 10 MG/ML
VIAL (ML) INTRAVENOUS
Status: DISCONTINUED | OUTPATIENT
Start: 2024-03-18 | End: 2024-03-18

## 2024-03-18 RX ORDER — ACETAMINOPHEN 500 MG
1000 TABLET ORAL EVERY 8 HOURS
Status: DISCONTINUED | OUTPATIENT
Start: 2024-03-18 | End: 2024-03-18

## 2024-03-18 RX ORDER — ACETAMINOPHEN 500 MG
1000 TABLET ORAL EVERY 8 HOURS PRN
Status: DISCONTINUED | OUTPATIENT
Start: 2024-03-18 | End: 2024-03-18

## 2024-03-18 RX ORDER — MEPERIDINE HYDROCHLORIDE 25 MG/ML
12.5 INJECTION INTRAMUSCULAR; INTRAVENOUS; SUBCUTANEOUS ONCE AS NEEDED
Status: DISCONTINUED | OUTPATIENT
Start: 2024-03-18 | End: 2024-03-19

## 2024-03-18 RX ORDER — ROCURONIUM BROMIDE 10 MG/ML
INJECTION, SOLUTION INTRAVENOUS
Status: DISCONTINUED | OUTPATIENT
Start: 2024-03-18 | End: 2024-03-18

## 2024-03-18 RX ADMIN — ACETAMINOPHEN 650 MG: 325 TABLET, FILM COATED ORAL at 02:03

## 2024-03-18 RX ADMIN — FENTANYL CITRATE 50 MCG: 50 INJECTION, SOLUTION INTRAMUSCULAR; INTRAVENOUS at 12:03

## 2024-03-18 RX ADMIN — MORPHINE SULFATE 4 MG: 4 INJECTION, SOLUTION INTRAMUSCULAR; INTRAVENOUS at 06:03

## 2024-03-18 RX ADMIN — PROPOFOL 150 MG: 10 INJECTION, EMULSION INTRAVENOUS at 12:03

## 2024-03-18 RX ADMIN — ONDANSETRON HYDROCHLORIDE 4 MG: 2 INJECTION INTRAMUSCULAR; INTRAVENOUS at 12:03

## 2024-03-18 RX ADMIN — BUPIVACAINE HYDROCHLORIDE 25 ML: 2.5 INJECTION, SOLUTION EPIDURAL; INFILTRATION; INTRACAUDAL; PERINEURAL at 12:03

## 2024-03-18 RX ADMIN — BUPIVACAINE 15 ML: 13.3 INJECTION, SUSPENSION, LIPOSOMAL INFILTRATION at 12:03

## 2024-03-18 RX ADMIN — SODIUM CHLORIDE, POTASSIUM CHLORIDE, SODIUM LACTATE AND CALCIUM CHLORIDE: 600; 310; 30; 20 INJECTION, SOLUTION INTRAVENOUS at 05:03

## 2024-03-18 RX ADMIN — ROCURONIUM BROMIDE 10 MG: 10 SOLUTION INTRAVENOUS at 12:03

## 2024-03-18 RX ADMIN — ONDANSETRON HYDROCHLORIDE 4 MG: 2 INJECTION, SOLUTION INTRAVENOUS at 12:03

## 2024-03-18 RX ADMIN — MIDAZOLAM HYDROCHLORIDE 2 MG: 1 INJECTION, SOLUTION INTRAMUSCULAR; INTRAVENOUS at 11:03

## 2024-03-18 RX ADMIN — PHENYLEPHRINE HYDROCHLORIDE 200 MCG: 10 INJECTION INTRAVENOUS at 12:03

## 2024-03-18 RX ADMIN — SUGAMMADEX 200 MG: 100 INJECTION, SOLUTION INTRAVENOUS at 01:03

## 2024-03-18 RX ADMIN — ROCURONIUM BROMIDE 30 MG: 10 SOLUTION INTRAVENOUS at 12:03

## 2024-03-18 RX ADMIN — PIPERACILLIN SODIUM AND TAZOBACTAM SODIUM 4.5 G: 4; .5 INJECTION, POWDER, LYOPHILIZED, FOR SOLUTION INTRAVENOUS at 05:03

## 2024-03-18 RX ADMIN — KETOROLAC TROMETHAMINE 30 MG: 30 INJECTION, SOLUTION INTRAMUSCULAR; INTRAVENOUS at 12:03

## 2024-03-18 RX ADMIN — PHENYLEPHRINE HYDROCHLORIDE 100 MCG: 10 INJECTION INTRAVENOUS at 12:03

## 2024-03-18 RX ADMIN — LIDOCAINE HYDROCHLORIDE 100 MG: 20 INJECTION, SOLUTION INTRAVENOUS at 12:03

## 2024-03-18 RX ADMIN — FENTANYL CITRATE 100 MCG: 50 INJECTION, SOLUTION INTRAMUSCULAR; INTRAVENOUS at 11:03

## 2024-03-18 RX ADMIN — DEXAMETHASONE SODIUM PHOSPHATE 8 MG: 4 INJECTION, SOLUTION INTRAMUSCULAR; INTRAVENOUS at 12:03

## 2024-03-18 RX ADMIN — SODIUM CHLORIDE, SODIUM LACTATE, POTASSIUM CHLORIDE, AND CALCIUM CHLORIDE: .6; .31; .03; .02 INJECTION, SOLUTION INTRAVENOUS at 12:03

## 2024-03-18 RX ADMIN — PIPERACILLIN SODIUM AND TAZOBACTAM SODIUM 4.5 G: 4; .5 INJECTION, POWDER, LYOPHILIZED, FOR SOLUTION INTRAVENOUS at 09:03

## 2024-03-18 RX ADMIN — MORPHINE SULFATE 2 MG: 2 INJECTION, SOLUTION INTRAMUSCULAR; INTRAVENOUS at 02:03

## 2024-03-18 NOTE — OR NURSING
VSS on RA. Pt denies pain. Dressings and PIV C/D/I. Martines removed in OR. Meets PACU discharge criteria, ready for transfer back to room. Updates given to Leida BOWIE. Family notified. Transferred to room with belongings.

## 2024-03-18 NOTE — HOSPITAL COURSE
Patient 24-year-old man without any chronic medical conditions admitted the hospital for treatment acute appendicitis associated with small contained abscess.  Patient treated with intravenous antibiotics.  General surgery consulted.  Patient underwent laparoscopic appendectomy with drainage of abscess performed by Dr. Man Pierson on 3/18/2024.  Patient did well following surgery and his diet was successfully advanced.  Patient cleared to discharge home to complete a course of oral antibiotics.  Outpatient follow up with Dr. Man Pierson for post-operative check.  Patient advised to return to emergency department develops fevers, chills, or severe abdominal pain.

## 2024-03-18 NOTE — ASSESSMENT & PLAN NOTE
-Placed in observation status  -Presented for evaluation of left lower back pain with associated nausea and vomiting.    -CT abdomen / pelvis showed findings concerning for acute appendicitis.  -General surgery, Dr. Pierson, consulted and input appreciated  -Noted significant leukocytosis and mild tachycardia as well as elevated temperature near febrile - now meets sepsis criteria.  -Check blood cultures and lactic acid  -Continue analgesics with iv morphine and scheduled tylenol.  -Start zosyn and IV fluids  -Needs appendectomy which is planned

## 2024-03-18 NOTE — SUBJECTIVE & OBJECTIVE
Interval History: No acute events overnight.  Groggy but answers all questions.  Noted pain in RLQ and LUQ.  Passing flatus.  Pain reasonably controlled.  Mother is at bedside.  All questions answered and patient had no further complaints.    Objective:     Vital Signs (Most Recent):  Temp: 100.2 °F (37.9 °C) (03/18/24 0722)  Pulse: 92 (03/18/24 0722)  Resp: 17 (03/18/24 0722)  BP: (!) 110/58 (03/18/24 0722)  SpO2: 96 % (03/18/24 0722) Vital Signs (24h Range):  Temp:  [98 °F (36.7 °C)-100.2 °F (37.9 °C)] 100.2 °F (37.9 °C)  Pulse:  [73-97] 92  Resp:  [17-20] 17  SpO2:  [96 %-100 %] 96 %  BP: (110-132)/(55-81) 110/58     Weight: 63.5 kg (139 lb 15.9 oz)  Body mass index is 17.5 kg/m².    Intake/Output Summary (Last 24 hours) at 3/18/2024 1300  Last data filed at 3/18/2024 1237  Gross per 24 hour   Intake 1000 ml   Output 530 ml   Net 470 ml         Physical Exam  Vitals and nursing note reviewed.   Constitutional:       General: He is not in acute distress.     Appearance: Normal appearance. He is normal weight. He is ill-appearing and toxic-appearing.   HENT:      Head: Normocephalic.      Mouth/Throat:      Mouth: Mucous membranes are moist.   Eyes:      Extraocular Movements: Extraocular movements intact.   Cardiovascular:      Rate and Rhythm: Normal rate and regular rhythm.      Pulses: Normal pulses.   Pulmonary:      Effort: Pulmonary effort is normal.      Breath sounds: Normal breath sounds.   Abdominal:      General: Bowel sounds are normal.      Comments: Slight TTP in RLQ and LUP.  No rebound or guarding.  Abdomen is soft and non-distended   Musculoskeletal:         General: Normal range of motion.      Cervical back: Normal range of motion.      Thoracic back: Tenderness (left lower back) present.      Lumbar back: Scoliosis present.   Skin:     General: Skin is warm and dry.   Neurological:      Mental Status: He is alert. Mental status is at baseline.      Comments: Groggy but arousable and oriented    Psychiatric:         Mood and Affect: Mood normal.             Significant Labs: All pertinent labs within the past 24 hours have been reviewed.    Significant Imaging: I have reviewed all pertinent imaging results/findings within the past 24 hours.

## 2024-03-18 NOTE — PROGRESS NOTES
[facility] [address] Medicine  Progress Note    Patient Name: Agusto Duckworth  MRN: [id number]  Patient Class: OP- Observation   Admission Date: 3/17/2024  Length of Stay: 0 days  Attending Physician: Darian Lee MD  Primary Care Provider: Yesenia Nelson MD        Subjective:     Principal Problem:Acute appendicitis        HPI:  Agusto Duckworth is a 24 year old with a past medical history of scoliosis who presented with left flank pain, nausea and vomiting that started this morning.  He denies trauma and has no urinary symptoms.  Patient states he took Aleve earlier today with minimal relief of his pain.  He states he started to feel nauseated with 1 episode of vomiting that he associates to taking NSAID on empty stomach.  Patient's started to alleviate while in the ED but then returned with associated nausea.    CMP and CBC performed in the ED unremarkable.  UA unremarkable.  Lumbar spine x-ray showed no acute fracture or dislocation.  CT abdomen pelvis revealed distended appendix with fluid and attenuating material concerning for early appendicitis.  ED provider spoke to Dr. Pierson, general surgery, who agreed to consult.  Patient was started on IV Zosyn and received IV morphine in the ED with some relief.  Patient referred to Hospital Medicine and will be placed in observation for further evaluation and management.       Overview/Hospital Course:  No notes on file    Interval History: No acute events overnight.  Groggy but answers all questions.  Noted pain in RLQ and LUQ.  Passing flatus.  Pain reasonably controlled.  Mother is at bedside.  All questions answered and patient had no further complaints.    Objective:     Vital Signs (Most Recent):  Temp: 100.2 °F (37.9 °C) (03/18/24 0722)  Pulse: 92 (03/18/24 0722)  Resp: 17 (03/18/24 0722)  BP: (!) 110/58 (03/18/24 0722)  SpO2: 96 % (03/18/24 0722) Vital Signs (24h Range):  Temp:  [98 °F (36.7 °C)-100.2 °F (37.9 °C)] 100.2 °F (37.9  °C)  Pulse:  [73-97] 92  Resp:  [17-20] 17  SpO2:  [96 %-100 %] 96 %  BP: (110-132)/(55-81) 110/58     Weight: 63.5 kg (139 lb 15.9 oz)  Body mass index is 17.5 kg/m².    Intake/Output Summary (Last 24 hours) at 3/18/2024 1300  Last data filed at 3/18/2024 1237  Gross per 24 hour   Intake 1000 ml   Output 530 ml   Net 470 ml         Physical Exam  Vitals and nursing note reviewed.   Constitutional:       General: He is not in acute distress.     Appearance: Normal appearance. He is normal weight. He is ill-appearing and toxic-appearing.   HENT:      Head: Normocephalic.      Mouth/Throat:      Mouth: Mucous membranes are moist.   Eyes:      Extraocular Movements: Extraocular movements intact.   Cardiovascular:      Rate and Rhythm: Normal rate and regular rhythm.      Pulses: Normal pulses.   Pulmonary:      Effort: Pulmonary effort is normal.      Breath sounds: Normal breath sounds.   Abdominal:      General: Bowel sounds are normal.      Comments: Slight TTP in RLQ and LUP.  No rebound or guarding.  Abdomen is soft and non-distended   Musculoskeletal:         General: Normal range of motion.      Cervical back: Normal range of motion.      Thoracic back: Tenderness (left lower back) present.      Lumbar back: Scoliosis present.   Skin:     General: Skin is warm and dry.   Neurological:      Mental Status: He is alert. Mental status is at baseline.      Comments: Groggy but arousable and oriented   Psychiatric:         Mood and Affect: Mood normal.             Significant Labs: All pertinent labs within the past 24 hours have been reviewed.    Significant Imaging: I have reviewed all pertinent imaging results/findings within the past 24 hours.    Assessment/Plan:      * Acute appendicitis  -Placed in observation status  -Presented for evaluation of left lower back pain with associated nausea and vomiting.    -CT abdomen / pelvis showed findings concerning for acute appendicitis.  -General surgery,   Dangelo, consulted and input appreciated  -Noted significant leukocytosis and mild tachycardia as well as elevated temperature near febrile - now meets sepsis criteria.  -Check blood cultures and lactic acid  -Continue analgesics with iv morphine and scheduled tylenol.  -Start zosyn and IV fluids  -Needs appendectomy which is planned    Sepsis  -Due to appendicitis  -Treatment as above.    Adolescent idiopathic scoliosis of thoracic region  -History noted.      VTE Risk Mitigation (From admission, onward)           Ordered     IP VTE LOW RISK PATIENT  Once         03/17/24 1715     Place sequential compression device  Until discontinued         03/17/24 1715                    Discharge Planning   ERASMO: 3/19/2024     Code Status: Full Code   Is the patient medically ready for discharge?:     Reason for patient still in hospital (select all that apply): Treatment  Discharge Plan A: Home                  Darian Lee MD  Department of Hospital Medicine   [facility] ([address])

## 2024-03-18 NOTE — ANESTHESIA POSTPROCEDURE EVALUATION
Anesthesia Post Evaluation    Patient: Agusto Duckworth    Procedure(s) Performed: Procedure(s) (LRB):  APPENDECTOMY, LAPAROSCOPIC (N/A)    Final Anesthesia Type: general      Patient location during evaluation: PACU  Patient participation: Yes- Able to Participate  Level of consciousness: awake and alert  Post-procedure vital signs: reviewed and stable  Pain management: adequate  Airway patency: patent    PONV status at discharge: No PONV  Anesthetic complications: no      Cardiovascular status: blood pressure returned to baseline  Respiratory status: spontaneous ventilation  Hydration status: euvolemic  Follow-up not needed.          Vitals Value Taken Time   /73 03/18/24 1347   Temp 37.1 °C (98.7 °F) 03/18/24 1314   Pulse 79 03/18/24 1354   Resp 16 03/18/24 1345   SpO2 100 % 03/18/24 1354   Vitals shown include unvalidated device data.      No case tracking events are documented in the log.      Pain/Vidhi Score: Pain Rating Prior to Med Admin: 7 (3/18/2024  6:31 AM)  Pain Rating Post Med Admin: 3 (3/18/2024  3:48 AM)  Vidhi Score: 9 (3/18/2024  1:30 PM)

## 2024-03-18 NOTE — NURSING
Patient has been educated and informed on NPO status at midnight. Patient has been educated that procedure is in the morning and nothing is to be consumed oral. Will continue to monitor.

## 2024-03-18 NOTE — ANESTHESIA PROCEDURE NOTES
Peripheral Block    Patient location during procedure: pre-op   Block not for primary anesthetic.  Reason for block: at surgeon's request and post-op pain management   Post-op Pain Location: abdominal wall pain    End time: 3/18/2024 12:10 PM    Staffing  Authorizing Provider: Mick Lindsey MD  Performing Provider: Mick Lindsey MD    Staffing  Performed by: Mick Lindsey MD  Authorized by: Mick Lindsey MD    Preanesthetic Checklist  Completed: patient identified, IV checked, site marked, risks and benefits discussed, surgical consent, monitors and equipment checked, pre-op evaluation and timeout performed  Peripheral Block  Patient position: supine  Prep: ChloraPrep  Patient monitoring: cardiac monitor, heart rate, continuous pulse ox, continuous capnometry and frequent blood pressure checks  Block type: TAP  Laterality: bilateral  Injection technique: single shot  Needle  Needle type: Stimuplex   Needle gauge: 21 G  Needle length: 4 in  Needle localization: ultrasound guidance   -ultrasound image captured on disc.  Assessment  Injection assessment: negative aspiration, negative parasthesia and local visualized surrounding nerve  Paresthesia pain: none  Heart rate change: no  Slow fractionated injection: yes  Pain Tolerance: comfortable throughout block and no complaints  Medications:    Medications: bupivacaine (pf) (MARCAINE) injection 0.25% - Perineural   25 mL - 3/18/2024 12:10:00 PM    Additional Notes  VSS.  [facility] RN monitoring vitals throughout procedure.  Patient tolerated procedure well.

## 2024-03-18 NOTE — CONSULTS
[facility] ([address])  General Surgery  Consult Note    Patient Name: Agusto Duckworth  MRN: [id number]  Code Status: Full Code  Admission Date: 3/17/2024  Hospital Length of Stay: 0 days  Attending Physician: Darian Lee MD  Primary Care Provider: Yesenia Nelson MD    Patient information was obtained from patient and ER records.     Inpatient consult to General Surgery  Consult performed by: Man Pierson MD  Consult ordered by: Reshma Leon NP  Reason for consult: Acute appendicitis  Assessment/Recommendations: To OR today for laparoscopic appendectomy  All risks and benefits discussed with the patient.            Subjective:     Principal Problem: Acute appendicitis    History of Present Illness: 24 year old with a past medical history of scoliosis who presented with left flank pain, nausea and vomiting that started this morning.  He denies trauma and has no urinary symptoms.  Patient states he took Aleve earlier today with minimal relief of his pain.  He states he started to feel nauseated with 1 episode of vomiting that he associates to taking NSAID on empty stomach.  Patient's started to alleviate while in the ED but then returned with associated nausea.     CMP and CBC performed in the ED unremarkable.  UA unremarkable.  Lumbar spine x-ray showed no acute fracture or dislocation.  CT abdomen pelvis revealed distended appendix with fluid and attenuating material concerning for early appendicitis.    No current facility-administered medications on file prior to encounter.     Current Outpatient Medications on File Prior to Encounter   Medication Sig    AMNESTEEM 20 mg capsule TK 1 C PO BID    ibuprofen (ADVIL,MOTRIN) 600 MG tablet Take 1 tablet (600 mg total) by mouth every 6 (six) hours as needed.    ondansetron (ZOFRAN-ODT) 4 MG TbDL Take 1 tablet (4 mg total) by mouth every 8 (eight) hours as needed.       Review of patient's allergies indicates:  No Known  Allergies    History reviewed. No pertinent past medical history.  History reviewed. No pertinent surgical history.  Family History       Problem Relation (Age of Onset)    Hypertension Maternal Grandmother          Tobacco Use    Smoking status: Never    Smokeless tobacco: Never   Substance and Sexual Activity    Alcohol use: No    Drug use: Yes     Types: Marijuana    Sexual activity: Never     Review of Systems   Constitutional:  Negative for appetite change, fatigue, fever and unexpected weight change.   HENT:  Negative for sore throat and trouble swallowing.    Eyes: Negative.    Respiratory:  Negative for cough, shortness of breath and wheezing.    Cardiovascular:  Negative for chest pain and leg swelling.   Gastrointestinal:  Positive for abdominal pain. Negative for abdominal distention, blood in stool, constipation, diarrhea, nausea and vomiting.   Endocrine: Negative.    Genitourinary: Negative.    Musculoskeletal:  Negative for back pain.   Skin: Negative.  Negative for rash.   Allergic/Immunologic: Negative.    Neurological: Negative.    Hematological: Negative.    Psychiatric/Behavioral:  Negative for confusion.      Objective:     Vital Signs (Most Recent):  Temp: 100.2 °F (37.9 °C) (03/18/24 0722)  Pulse: 92 (03/18/24 0722)  Resp: 17 (03/18/24 0722)  BP: (!) 110/58 (03/18/24 0722)  SpO2: 96 % (03/18/24 0722) Vital Signs (24h Range):  Temp:  [98 °F (36.7 °C)-100.2 °F (37.9 °C)] 100.2 °F (37.9 °C)  Pulse:  [73-97] 92  Resp:  [17-20] 17  SpO2:  [96 %-100 %] 96 %  BP: (110-132)/(55-81) 110/58     Weight: 63.5 kg (139 lb 15.9 oz)  Body mass index is 17.5 kg/m².     Physical Exam  Vitals and nursing note reviewed.   Constitutional:       Appearance: He is well-developed.   HENT:      Head: Normocephalic and atraumatic.   Cardiovascular:      Rate and Rhythm: Normal rate.      Heart sounds: Normal heart sounds.   Pulmonary:      Effort: Pulmonary effort is normal.   Abdominal:      General: Bowel sounds are  normal. There is no distension.      Palpations: Abdomen is soft.      Tenderness: There is abdominal tenderness (Right lower quadrant and suprapubic).   Musculoskeletal:         General: Normal range of motion.      Cervical back: Normal range of motion.   Skin:     General: Skin is warm and dry.      Capillary Refill: Capillary refill takes less than 2 seconds.   Neurological:      Mental Status: He is alert and oriented to person, place, and time.   Psychiatric:         Behavior: Behavior normal.            I have reviewed all pertinent lab results within the past 24 hours.  CBC:   Recent Labs   Lab 03/18/24  0525   WBC 23.86*   RBC 4.78   HGB 13.8*   HCT 40.7      MCV 85   MCH 28.9   MCHC 33.9     CMP:   Recent Labs   Lab 03/17/24  1142 03/18/24  0525   GLU 99 101   CALCIUM 9.6 9.2   ALBUMIN 4.8  --    PROT 8.3  --     135*   K 3.8 3.6   CO2 24 22*    103   BUN 9 6   CREATININE 0.9 0.8   ALKPHOS 55  --    ALT 12  --    AST 15  --    BILITOT 0.6  --        Significant Diagnostics:  I have reviewed all pertinent imaging results/findings within the past 24 hours.  CT: I have reviewed all pertinent results/findings within the past 24 hours and my personal findings are:  Acute appendicitis    Assessment/Plan:     No notes have been filed under this hospital service.  Service: General Surgery    VTE Risk Mitigation (From admission, onward)           Ordered     IP VTE LOW RISK PATIENT  Once         03/17/24 1715     Place sequential compression device  Until discontinued         03/17/24 1715                    Thank you for your consult. I will follow-up with patient. Please contact us if you have any additional questions.    Man Pierson MD  General Surgery  [facility] ([address])

## 2024-03-18 NOTE — INTERVAL H&P NOTE
The patient has been examined and the H&P has been reviewed:    I concur with the findings and no changes have occurred since H&P was written.    Surgery risks, benefits and alternative options discussed and understood by patient/family.          Active Hospital Problems    Diagnosis  POA    *Acute appendicitis [K35.80]  Yes    Adolescent idiopathic scoliosis of thoracic region [M41.124]  Yes      Resolved Hospital Problems   No resolved problems to display.

## 2024-03-18 NOTE — SUBJECTIVE & OBJECTIVE
No current facility-administered medications on file prior to encounter.     Current Outpatient Medications on File Prior to Encounter   Medication Sig    AMNESTEEM 20 mg capsule TK 1 C PO BID    ibuprofen (ADVIL,MOTRIN) 600 MG tablet Take 1 tablet (600 mg total) by mouth every 6 (six) hours as needed.    ondansetron (ZOFRAN-ODT) 4 MG TbDL Take 1 tablet (4 mg total) by mouth every 8 (eight) hours as needed.       Review of patient's allergies indicates:  No Known Allergies    History reviewed. No pertinent past medical history.  History reviewed. No pertinent surgical history.  Family History       Problem Relation (Age of Onset)    Hypertension Maternal Grandmother          Tobacco Use    Smoking status: Never    Smokeless tobacco: Never   Substance and Sexual Activity    Alcohol use: No    Drug use: Yes     Types: Marijuana    Sexual activity: Never     Review of Systems   Constitutional:  Negative for appetite change, fatigue, fever and unexpected weight change.   HENT:  Negative for sore throat and trouble swallowing.    Eyes: Negative.    Respiratory:  Negative for cough, shortness of breath and wheezing.    Cardiovascular:  Negative for chest pain and leg swelling.   Gastrointestinal:  Positive for abdominal pain. Negative for abdominal distention, blood in stool, constipation, diarrhea, nausea and vomiting.   Endocrine: Negative.    Genitourinary: Negative.    Musculoskeletal:  Negative for back pain.   Skin: Negative.  Negative for rash.   Allergic/Immunologic: Negative.    Neurological: Negative.    Hematological: Negative.    Psychiatric/Behavioral:  Negative for confusion.      Objective:     Vital Signs (Most Recent):  Temp: 100.2 °F (37.9 °C) (03/18/24 0722)  Pulse: 92 (03/18/24 0722)  Resp: 17 (03/18/24 0722)  BP: (!) 110/58 (03/18/24 0722)  SpO2: 96 % (03/18/24 0722) Vital Signs (24h Range):  Temp:  [98 °F (36.7 °C)-100.2 °F (37.9 °C)] 100.2 °F (37.9 °C)  Pulse:  [73-97] 92  Resp:  [17-20] 17  SpO2:   [96 %-100 %] 96 %  BP: (110-132)/(55-81) 110/58     Weight: 63.5 kg (139 lb 15.9 oz)  Body mass index is 17.5 kg/m².     Physical Exam  Vitals and nursing note reviewed.   Constitutional:       Appearance: He is well-developed.   HENT:      Head: Normocephalic and atraumatic.   Cardiovascular:      Rate and Rhythm: Normal rate.      Heart sounds: Normal heart sounds.   Pulmonary:      Effort: Pulmonary effort is normal.   Abdominal:      General: Bowel sounds are normal. There is no distension.      Palpations: Abdomen is soft.      Tenderness: There is abdominal tenderness (Right lower quadrant and suprapubic).   Musculoskeletal:         General: Normal range of motion.      Cervical back: Normal range of motion.   Skin:     General: Skin is warm and dry.      Capillary Refill: Capillary refill takes less than 2 seconds.   Neurological:      Mental Status: He is alert and oriented to person, place, and time.   Psychiatric:         Behavior: Behavior normal.            I have reviewed all pertinent lab results within the past 24 hours.  CBC:   Recent Labs   Lab 03/18/24  0525   WBC 23.86*   RBC 4.78   HGB 13.8*   HCT 40.7      MCV 85   MCH 28.9   MCHC 33.9     CMP:   Recent Labs   Lab 03/17/24  1142 03/18/24  0525   GLU 99 101   CALCIUM 9.6 9.2   ALBUMIN 4.8  --    PROT 8.3  --     135*   K 3.8 3.6   CO2 24 22*    103   BUN 9 6   CREATININE 0.9 0.8   ALKPHOS 55  --    ALT 12  --    AST 15  --    BILITOT 0.6  --        Significant Diagnostics:  I have reviewed all pertinent imaging results/findings within the past 24 hours.  CT: I have reviewed all pertinent results/findings within the past 24 hours and my personal findings are:  Acute appendicitis

## 2024-03-18 NOTE — HPI
24 year old with a past medical history of scoliosis who presented with left flank pain, nausea and vomiting that started this morning.  He denies trauma and has no urinary symptoms.  Patient states he took Aleve earlier today with minimal relief of his pain.  He states he started to feel nauseated with 1 episode of vomiting that he associates to taking NSAID on empty stomach.  Patient's started to alleviate while in the ED but then returned with associated nausea.     CMP and CBC performed in the ED unremarkable.  UA unremarkable.  Lumbar spine x-ray showed no acute fracture or dislocation.  CT abdomen pelvis revealed distended appendix with fluid and attenuating material concerning for early appendicitis.

## 2024-03-18 NOTE — PLAN OF CARE
Lives with friend - independent - mother will provide transportation home     [facility] ([address])  Initial Discharge Assessment       Primary Care Provider: Yesenia Nelson MD    Admission Diagnosis: Nausea & vomiting [R11.2]  Left flank pain [R10.9]  Chest pain [R07.9]  Acute appendicitis [K35.80]  Acute appendicitis with localized peritonitis, without perforation, abscess, or gangrene [K35.30]    Admission Date: 3/17/2024  Expected Discharge Date:     Transition of Care Barriers: None    Payor: MEDICAID / Plan: [facility] ([employer]) / Product Type: Managed Medicaid /     Extended Emergency Contact Information  Primary Emergency Contact: BlakeBubba  Address: [address] [place], [place] [address] [facility]  Home Phone: [phone]  Relation: Grandparent  Secondary Emergency Contact: Karen Duckworth  Address: [address] [place] [address]  Home Phone: [phone]  Relation: Mother    Discharge Plan A: Home         [id number] - [place], [place] - [address] [facility] [address]  [address]  [facility] [address]  Phone: [phone] Fax: [phone]      Initial Assessment (most recent)       Adult Discharge Assessment - 03/18/24 0742          Discharge Assessment    Assessment Type Discharge Planning Assessment     Confirmed/corrected address, phone number and insurance Yes     Confirmed Demographics Correct on Facesheet   deleted old phone #    Source of Information family     Does patient/caregiver understand observation status Yes     Communicated ERASMO with patient/caregiver Yes     People in Home friend(s)     Do you expect to return to your current living situation? Yes     Do you have help at home or someone to help you manage your care at home? Yes     Prior to hospitilization cognitive status: Alert/Oriented     Current cognitive status: Alert/Oriented      Walking or Climbing Stairs Difficulty no     Dressing/Bathing Difficulty no     Equipment Currently Used at Home none     Readmission within 30 days? No     Patient currently being followed by outpatient case management? No     Do you currently have service(s) that help you manage your care at home? No     Do you take prescription medications? No     Do you have prescription coverage? Yes     Who is going to help you get home at discharge? mother     How do you get to doctors appointments? car, drives self     Are you on dialysis? No     Discharge Plan A Home     DME Needed Upon Discharge  none     Discharge Plan discussed with: Parent(s)     Transition of Care Barriers None        Physical Activity    On average, how many days per week do you engage in moderate to strenuous exercise (like a brisk walk)? 0 days     On average, how many minutes do you engage in exercise at this level? 0 min        Financial Resource Strain    How hard is it for you to pay for the very basics like food, housing, medical care, and heating? Not very hard        Housing Stability    In the last 12 months, was there a time when you were not able to pay the mortgage or rent on time? No     In the last 12 months, how many places have you lived? 2     In the last 12 months, was there a time when you did not have a steady place to sleep or slept in a shelter (including now)? No        Transportation Needs    In the past 12 months, has lack of transportation kept you from medical appointments or from getting medications? No     In the past 12 months, has lack of transportation kept you from meetings, work, or from getting things needed for daily living? No        Food Insecurity    Within the past 12 months, you worried that your food would run out before you got the money to buy more. Never true     Within the past 12 months, the food you bought just didn't last and you didn't have money to get more. Never true        Stress    Do you feel  stress - tense, restless, nervous, or anxious, or unable to sleep at night because your mind is troubled all the time - these days? Patient unable to answer        Social Connections    In a typical week, how many times do you talk on the phone with family, friends, or neighbors? More than three times a week     How often do you get together with friends or relatives? More than three times a week     How often do you attend Religious or Methodist services? 1 to 4 times per year     Do you belong to any clubs or organizations such as Religious groups, unions, fraternal or athletic groups, or school groups? No     How often do you attend meetings of the clubs or organizations you belong to? Never     Are you , , , , never , or living with a partner? Never         Alcohol Use    Q1: How often do you have a drink containing alcohol? Monthly or less     Q2: How many drinks containing alcohol do you have on a typical day when you are drinking? 1 or 2     Q3: How often do you have six or more drinks on one occasion? Never

## 2024-03-18 NOTE — NURSING
Report received from Yinka. Patient transferred from ER admitted to room 354. Head to toe assessment complete. No acute distress noted at this time Will continue to monitor.

## 2024-03-18 NOTE — TRANSFER OF CARE
"Anesthesia Transfer of Care Note    Patient: Agusto Duckworth    Procedure(s) Performed: Procedure(s) (LRB):  APPENDECTOMY, LAPAROSCOPIC (N/A)    Patient location: PACU    Anesthesia Type: general    Transport from OR: Transported from OR on 2-3 L/min O2 by NC with adequate spontaneous ventilation    Post pain: adequate analgesia    Post assessment: tolerated procedure well and no apparent anesthetic complications    Post vital signs: stable    Level of consciousness: responds to stimulation and sedated    Nausea/Vomiting: no nausea/vomiting    Complications: none    Transfer of care protocol was followed      Last vitals: Visit Vitals  BP (!) 110/58 (BP Location: Left arm, Patient Position: Lying)   Pulse 92   Temp 37.1 °C (98.7 °F) (Skin)   Resp 17   Ht 6' 3" (1.905 m)   Wt 63.5 kg (139 lb 15.9 oz)   SpO2 96%   BMI 17.50 kg/m²     "

## 2024-03-18 NOTE — NURSING
Nurses Note -- 4 Eyes      3/18/2024   1730 PM      Skin assessed during: Transfer      [] No Altered Skin Integrity Present    []Prevention Measures Documented      [x] Yes- Altered Skin Integrity Present or Discovered   [] LDA Added if Not in Epic (Describe Wound)   [x] New Altered Skin Integrity was Present on Admit and Documented in LDA   [] Wound Image Taken    Wound Care Consulted? No    Attending Nurse:  Leida Dutta RN/Staff Member:  Anabela EDMOND

## 2024-03-18 NOTE — HOSPITAL COURSE
Patient still with abdominal pain, right lower quadrant suprapubic.    CT scan shows acute appendicitis with small amount of fluid in the pelvis

## 2024-03-18 NOTE — ANESTHESIA PROCEDURE NOTES
Intubation    Date/Time: 3/18/2024 12:20 PM    Performed by: Alexandr Velasco CRNA  Authorized by: Mick Lindsey MD    Intubation:     Induction:  Rapid sequence induction    Intubated:  Postinduction    Mask Ventilation:  Not attempted    Attempts:  1    Attempted By:  CRNA    Method of Intubation:  Video laryngoscopy    Blade:  Coats 3    Laryngeal View Grade: Grade I - full view of cords      Difficult Airway Encountered?: No      Complications:  None    Airway Device:  Oral endotracheal tube    Airway Device Size:  7.5    Style/Cuff Inflation:  Cuffed (inflated to minimal occlusive pressure)    Tube secured:  23    Secured at:  The lips    Placement Verified By:  Capnometry    Complicating Factors:  None    Findings Post-Intubation:  BS equal bilateral and atraumatic/condition of teeth unchanged

## 2024-03-18 NOTE — OP NOTE
DATE OF PROCEDURE: 03/18/2024      PREOPERATIVE DIAGNOSIS: Acute appendicitis with purulence    POSTOPERATIVE DIAGNOSIS: Acute appendicitis.     PROCEDURE PERFORMED: Laparoscopic appendectomy with drainage of pelvic abscess    ATTENDING SURGEON: Man Pierson MD.       ANESTHESIA: General endotracheal.     ESTIMATED BLOOD LOSS: 5 mL.     FINDINGS: Acute non- perforated appendicitis with purulence    SPECIMENs: Appendix.     DRAINS: None.     COMPLICATIONS: None.      OPERATIVE PROCEDURE: The patient was identified in Preoperative Holding and  brought back to the Operating Room. Placed supine on the operating table and padded appropriately. Monitors were applied and there was smooth induction of general endotracheal anesthesia. A Martines catheter was placed. The patient's abdomen was prepped and draped in the standard sterile surgical fashion. A time-out was performed and all team members present agreed this was the correct procedure on the correct patient. We also confirmed administration of appropriate preoperative antibiotics.    Skin was grasped with penetrating towel clip and elevated and a veress needle was used to enter the peritoneum. A 1.5cm umbilical skin incision was made. The abdomen was entered using a 5mm bladed trocar. The abdomen was insufflated with carbon dioxide to a maximum pressure of 15 mmHg. A 5-mm laparoscope was placed and the abdomen was examined. There was no evidence of injury from the initial trocar placement. Two 5-mm trocars were placed under direct vision through separate stab incisions, one in the suprapubic area avoiding the dome of the bladder and one in the left lower quadrant avoiding the inferior epigastric artery. We also exchanged our umbilical trocar for an 11mm trocar. We directed our attention to the right lower quadrant. The appendix was identified and noted to have  significant inflammatory change without evidence of perforation. There was some purulent fluid in the  right lower quadrant and the pelvis.The appendix was elevated. A mesenteric defect was made at the base of the appendix using the [place] dissector. The appendix was divided at the base using the Endo-MARLEY stapler with a blue (45-3.5) load. The mesoappendix was then divided with two white (45-2.5) loads of the stapler. The appendix was placed into an Endocatch bag and removed from the umbilical site. We returned the laparoscope and trocar to the umbilicus and reexamined the right lower quadrant. The staple line on the mesoappendix was examined and no bleeding was noted. The base of the appendix was examined and appeared viable and well-sealed. The right lower quadrant was suctioned as well as the pelvis. . All ports were removed under direct vision and no bleeding from any port site was noted. The insufflation of the abdomen was evacuated and the laparoscopewas removed. The fascial incision at the umbilical port site was closed with 0 Vicryl stitch in a figure of eight fashion. All port sites were infiltrated with Lidocaine and closed in a subcuticular fashion. Skin glue was placed. The patient's Martines catheter was removed. The patient was extubated in the Operating Room and transported to the Recovery Room in stable condition. All sponge, instrument and needle counts were correct at the end of the case. I was present and scrubbed for the entire procedure.

## 2024-03-18 NOTE — ANESTHESIA PREPROCEDURE EVALUATION
03/18/2024  Agusto Duckworth is a 24 y.o., male.      Pre-op Assessment    I have reviewed the Patient Summary Reports.     I have reviewed the Nursing Notes. I have reviewed the NPO Status.   I have reviewed the Medications.     Review of Systems  Anesthesia Hx:             Denies Family Hx of Anesthesia complications.    Denies Personal Hx of Anesthesia complications.                    Social:  Recreational Drugs THC      Hematology/Oncology:  Hematology Normal   Oncology Normal                                   Cardiovascular:  Cardiovascular Normal Exercise tolerance: good                                           Pulmonary:  Pulmonary Normal                       Renal/:  Renal/ Normal                 Hepatic/GI:  Hepatic/GI Normal                 Musculoskeletal:     Scoliosis       Spine Disorders:             Neurological:  Neurology Normal                                      Endocrine:  Endocrine Normal            Psych:  Psychiatric Normal                  Physical Exam  General: Well nourished and Cooperative    Airway:  Mallampati: II   Mouth Opening: Normal  TM Distance: Normal  Neck ROM: Normal ROM    Dental:  Intact    Anesthesia Plan  Type of Anesthesia, risks & benefits discussed:    Anesthesia Type: Gen ETT  Intra-op Monitoring Plan: Standard ASA Monitors  Post Op Pain Control Plan: multimodal analgesia and peripheral nerve block  Induction:  IV  Airway Plan: Video  Informed Consent: Informed consent signed with the Patient and all parties understand the risks and agree with anesthesia plan.  All questions answered.   ASA Score: 2 Emergent  Day of Surgery Review of History & Physical: H&P Update referred to the surgeon/provider.    Ready For Surgery From Anesthesia Perspective.   .

## 2024-03-19 VITALS
HEIGHT: 75 IN | DIASTOLIC BLOOD PRESSURE: 59 MMHG | SYSTOLIC BLOOD PRESSURE: 112 MMHG | TEMPERATURE: 98 F | HEART RATE: 64 BPM | BODY MASS INDEX: 17.41 KG/M2 | WEIGHT: 140 LBS | OXYGEN SATURATION: 100 % | RESPIRATION RATE: 20 BRPM

## 2024-03-19 LAB
ANION GAP SERPL CALC-SCNC: 11 MMOL/L (ref 8–16)
BASOPHILS # BLD AUTO: 0.01 K/UL (ref 0–0.2)
BASOPHILS NFR BLD: 0.1 % (ref 0–1.9)
BUN SERPL-MCNC: 9 MG/DL (ref 6–20)
CALCIUM SERPL-MCNC: 8.9 MG/DL (ref 8.7–10.5)
CHLORIDE SERPL-SCNC: 105 MMOL/L (ref 95–110)
CO2 SERPL-SCNC: 22 MMOL/L (ref 23–29)
CREAT SERPL-MCNC: 0.8 MG/DL (ref 0.5–1.4)
DIFFERENTIAL METHOD BLD: ABNORMAL
EOSINOPHIL # BLD AUTO: 0 K/UL (ref 0–0.5)
EOSINOPHIL NFR BLD: 0 % (ref 0–8)
ERYTHROCYTE [DISTWIDTH] IN BLOOD BY AUTOMATED COUNT: 12.3 % (ref 11.5–14.5)
EST. GFR  (NO RACE VARIABLE): >60 ML/MIN/1.73 M^2
GLUCOSE SERPL-MCNC: 103 MG/DL (ref 70–110)
HCT VFR BLD AUTO: 38.3 % (ref 40–54)
HGB BLD-MCNC: 13 G/DL (ref 14–18)
IMM GRANULOCYTES # BLD AUTO: 0.06 K/UL (ref 0–0.04)
IMM GRANULOCYTES NFR BLD AUTO: 0.4 % (ref 0–0.5)
LYMPHOCYTES # BLD AUTO: 1 K/UL (ref 1–4.8)
LYMPHOCYTES NFR BLD: 7.2 % (ref 18–48)
MAGNESIUM SERPL-MCNC: 1.9 MG/DL (ref 1.6–2.6)
MCH RBC QN AUTO: 28.8 PG (ref 27–31)
MCHC RBC AUTO-ENTMCNC: 33.9 G/DL (ref 32–36)
MCV RBC AUTO: 85 FL (ref 82–98)
MONOCYTES # BLD AUTO: 1.6 K/UL (ref 0.3–1)
MONOCYTES NFR BLD: 11.4 % (ref 4–15)
NEUTROPHILS # BLD AUTO: 11.2 K/UL (ref 1.8–7.7)
NEUTROPHILS NFR BLD: 80.9 % (ref 38–73)
NRBC BLD-RTO: 0 /100 WBC
PHOSPHATE SERPL-MCNC: 3.3 MG/DL (ref 2.7–4.5)
PLATELET # BLD AUTO: 310 K/UL (ref 150–450)
PMV BLD AUTO: 9.6 FL (ref 9.2–12.9)
POTASSIUM SERPL-SCNC: 3.6 MMOL/L (ref 3.5–5.1)
RBC # BLD AUTO: 4.52 M/UL (ref 4.6–6.2)
SODIUM SERPL-SCNC: 138 MMOL/L (ref 136–145)
WBC # BLD AUTO: 13.81 K/UL (ref 3.9–12.7)

## 2024-03-19 PROCEDURE — G0378 HOSPITAL OBSERVATION PER HR: HCPCS

## 2024-03-19 PROCEDURE — 96366 THER/PROPH/DIAG IV INF ADDON: CPT

## 2024-03-19 PROCEDURE — 83735 ASSAY OF MAGNESIUM: CPT | Performed by: HOSPITALIST

## 2024-03-19 PROCEDURE — 36415 COLL VENOUS BLD VENIPUNCTURE: CPT | Performed by: HOSPITALIST

## 2024-03-19 PROCEDURE — 25000003 PHARM REV CODE 250: Performed by: ANESTHESIOLOGY

## 2024-03-19 PROCEDURE — 96376 TX/PRO/DX INJ SAME DRUG ADON: CPT

## 2024-03-19 PROCEDURE — 25000003 PHARM REV CODE 250: Performed by: HOSPITALIST

## 2024-03-19 PROCEDURE — 94761 N-INVAS EAR/PLS OXIMETRY MLT: CPT

## 2024-03-19 PROCEDURE — 84100 ASSAY OF PHOSPHORUS: CPT | Performed by: HOSPITALIST

## 2024-03-19 PROCEDURE — 80048 BASIC METABOLIC PNL TOTAL CA: CPT | Performed by: HOSPITALIST

## 2024-03-19 PROCEDURE — 96361 HYDRATE IV INFUSION ADD-ON: CPT

## 2024-03-19 PROCEDURE — 96365 THER/PROPH/DIAG IV INF INIT: CPT | Mod: 59

## 2024-03-19 PROCEDURE — 63600175 PHARM REV CODE 636 W HCPCS: Performed by: HOSPITALIST

## 2024-03-19 PROCEDURE — 85025 COMPLETE CBC W/AUTO DIFF WBC: CPT | Performed by: HOSPITALIST

## 2024-03-19 RX ORDER — AMOXICILLIN AND CLAVULANATE POTASSIUM 875; 125 MG/1; MG/1
1 TABLET, FILM COATED ORAL EVERY 12 HOURS
Qty: 14 TABLET | Refills: 0 | Status: SHIPPED | OUTPATIENT
Start: 2024-03-19 | End: 2024-03-26

## 2024-03-19 RX ORDER — DIPHENHYDRAMINE HYDROCHLORIDE 50 MG/ML
25 INJECTION INTRAMUSCULAR; INTRAVENOUS EVERY 6 HOURS PRN
Status: DISCONTINUED | OUTPATIENT
Start: 2024-03-19 | End: 2024-03-19 | Stop reason: HOSPADM

## 2024-03-19 RX ORDER — HYDROMORPHONE HYDROCHLORIDE 2 MG/ML
0.4 INJECTION, SOLUTION INTRAMUSCULAR; INTRAVENOUS; SUBCUTANEOUS EVERY 5 MIN PRN
Status: DISCONTINUED | OUTPATIENT
Start: 2024-03-19 | End: 2024-03-19 | Stop reason: HOSPADM

## 2024-03-19 RX ORDER — CELECOXIB 200 MG/1
400 CAPSULE ORAL
Status: DISCONTINUED | OUTPATIENT
Start: 2024-03-19 | End: 2024-03-19 | Stop reason: HOSPADM

## 2024-03-19 RX ORDER — ACETAMINOPHEN 500 MG
1000 TABLET ORAL
Status: DISCONTINUED | OUTPATIENT
Start: 2024-03-19 | End: 2024-03-19

## 2024-03-19 RX ORDER — PROCHLORPERAZINE EDISYLATE 5 MG/ML
5 INJECTION INTRAMUSCULAR; INTRAVENOUS EVERY 30 MIN PRN
Status: DISCONTINUED | OUTPATIENT
Start: 2024-03-19 | End: 2024-03-19 | Stop reason: HOSPADM

## 2024-03-19 RX ORDER — OXYCODONE HYDROCHLORIDE 5 MG/1
5 TABLET ORAL
Status: DISCONTINUED | OUTPATIENT
Start: 2024-03-19 | End: 2024-03-19 | Stop reason: HOSPADM

## 2024-03-19 RX ORDER — CELECOXIB 200 MG/1
400 CAPSULE ORAL
Status: DISCONTINUED | OUTPATIENT
Start: 2024-03-19 | End: 2024-03-19

## 2024-03-19 RX ORDER — ACETAMINOPHEN 500 MG
1000 TABLET ORAL EVERY 8 HOURS PRN
Refills: 0 | COMMUNITY
Start: 2024-03-19

## 2024-03-19 RX ORDER — OXYCODONE HYDROCHLORIDE 5 MG/1
5 TABLET ORAL EVERY 8 HOURS PRN
Qty: 12 TABLET | Refills: 0 | Status: SHIPPED | OUTPATIENT
Start: 2024-03-19 | End: 2024-03-23

## 2024-03-19 RX ORDER — SODIUM CHLORIDE 0.9 % (FLUSH) 0.9 %
3 SYRINGE (ML) INJECTION
Status: DISCONTINUED | OUTPATIENT
Start: 2024-03-19 | End: 2024-03-19 | Stop reason: HOSPADM

## 2024-03-19 RX ORDER — MEPERIDINE HYDROCHLORIDE 25 MG/ML
12.5 INJECTION INTRAMUSCULAR; INTRAVENOUS; SUBCUTANEOUS ONCE AS NEEDED
Status: DISCONTINUED | OUTPATIENT
Start: 2024-03-19 | End: 2024-03-19 | Stop reason: HOSPADM

## 2024-03-19 RX ORDER — ACETAMINOPHEN 500 MG
1000 TABLET ORAL
Status: DISCONTINUED | OUTPATIENT
Start: 2024-03-19 | End: 2024-03-19 | Stop reason: HOSPADM

## 2024-03-19 RX ADMIN — PIPERACILLIN SODIUM AND TAZOBACTAM SODIUM 4.5 G: 4; .5 INJECTION, POWDER, LYOPHILIZED, FOR SOLUTION INTRAVENOUS at 10:03

## 2024-03-19 RX ADMIN — ACETAMINOPHEN 1000 MG: 500 TABLET ORAL at 09:03

## 2024-03-19 RX ADMIN — PIPERACILLIN SODIUM AND TAZOBACTAM SODIUM 4.5 G: 4; .5 INJECTION, POWDER, LYOPHILIZED, FOR SOLUTION INTRAVENOUS at 12:03

## 2024-03-19 RX ADMIN — OXYCODONE 5 MG: 5 TABLET ORAL at 12:03

## 2024-03-19 RX ADMIN — SODIUM CHLORIDE, POTASSIUM CHLORIDE, SODIUM LACTATE AND CALCIUM CHLORIDE: 600; 310; 30; 20 INJECTION, SOLUTION INTRAVENOUS at 04:03

## 2024-03-19 NOTE — PLAN OF CARE
CM met with pt for final discharge planning assessment. Pt will discharge home today.    Family to provide transportation home.    Follow-up apt scheduled and is in AVS.    Meds to be delivered to bedside from [facility] prior to discharge.    Pt is ready for discharge from CM perspective.   03/19/24 1448   Final Note   Assessment Type Final Discharge Note   Anticipated Discharge Disposition Home   What phone number can be called within the next 1-3 days to see how you are doing after discharge? [phone]   Hospital Resources/Appts/Education Provided Provided patient/caregiver with written discharge plan information;Provided education on problems/symptoms using teachback;Appointments scheduled and added to AVS   Post-Acute Status   Discharge Delays None known at this time     [facility] ([address])  Discharge Final Note    Primary Care Provider: Yesenia Nelson MD    Expected Discharge Date: 3/19/2024    Final Discharge Note (most recent)       Final Note - 03/19/24 1448          Final Note    Assessment Type Final Discharge Note (P)      Anticipated Discharge Disposition Home or Self Care (P)      What phone number can be called within the next 1-3 days to see how you are doing after discharge? [phone] (P)      Hospital Resources/Appts/Education Provided Provided patient/caregiver with written discharge plan information;Provided education on problems/symptoms using teachback;Appointments scheduled and added to AVS (P)         Post-Acute Status    Discharge Delays None known at this time (P)                      Important Message from Medicare             Contact Info       Man Peirson MD   Specialty: General Surgery, Bariatrics    [address]  [place] [address]   Phone: [phone]       Next Steps: Schedule an appointment as soon as possible for a visit in 2 week(s)

## 2024-03-19 NOTE — PLAN OF CARE
DC home c Mom pending Medication delivery per outpt pharmacy.    In agreement and eager for DC.  VU of DC instructions, paperwork passed. IV removed with cath tip intact, WNL.  Will be escorted downstairs via  transport team once dressed and ready-p meds delivered.   Free from falls or skin breakdown this hospital admission.     Laps x3 CDI

## 2024-03-20 ENCOUNTER — TELEPHONE (OUTPATIENT)
Dept: SURGERY | Facility: CLINIC | Age: 24
End: 2024-03-20
Payer: MEDICAID

## 2024-03-20 NOTE — TELEPHONE ENCOUNTER
Spoke with patient. Stated that he did not get the medication when he discharged from the hospital yesterday due to the pharmacy being closed at the time he was discharged. Informed patient the medication order was sent to the [facility] and should be available for pickup today. Verbalized understanding. No further issues discussed.

## 2024-03-20 NOTE — TELEPHONE ENCOUNTER
----- Message from Mae Figueroa sent at 3/19/2024  5:38 PM CDT -----  Type:  Needs Medical Advice    Who Called: Pt  Would the patient rather a call back or a response via [facility]? Callback   Best Call Back Number:  [phone]  Additional Information:  Pt is requesting a callback in regards to medication    .

## 2024-03-21 LAB
FINAL PATHOLOGIC DIAGNOSIS: NORMAL
GROSS: NORMAL
Lab: NORMAL

## 2024-03-23 LAB
BACTERIA BLD CULT: NORMAL
BACTERIA BLD CULT: NORMAL

## 2024-03-23 NOTE — DISCHARGE SUMMARY
[facility] [address] Medicine  Discharge Summary      Patient Name: Agusto Duckworth  MRN: [id number]  [facility]: [id number]  Patient Class: OP- Observation  Admission Date: 3/17/2024  Hospital Length of Stay: 0 days  Discharge Date and Time: 3/19/2024  5:23 PM  Attending Physician: Oswald Betancourt MD  Discharging Provider: Oswald Betancourt MD  Primary Care Provider: Yesenia Nelson MD    HPI:   Agusto Duckworth is a 24 year old with a past medical history of scoliosis who presented with left flank pain, nausea and vomiting that started this morning.  He denies trauma and has no urinary symptoms.  Patient states he took Aleve earlier today with minimal relief of his pain.  He states he started to feel nauseated with 1 episode of vomiting that he associates to taking NSAID on empty stomach.  Patient's started to alleviate while in the ED but then returned with associated nausea.    CMP and CBC performed in the ED unremarkable.  UA unremarkable.  Lumbar spine x-ray showed no acute fracture or dislocation.  CT abdomen pelvis revealed distended appendix with fluid and attenuating material concerning for early appendicitis.  ED provider spoke to Dr. Pierson, general surgery, who agreed to consult.  Patient was started on IV Zosyn and received IV morphine in the ED with some relief.  Patient referred to Hospital Medicine and will be placed in observation for further evaluation and management.       Procedure(s) (LRB):  APPENDECTOMY, LAPAROSCOPIC (N/A)      Hospital Course:   Patient 24-year-old man without any chronic medical conditions admitted the hospital for treatment acute appendicitis associated with small contained abscess.  Patient treated with intravenous antibiotics.  General surgery consulted.  Patient underwent laparoscopic appendectomy with drainage of abscess performed by Dr. Man Pierson on 3/18/2024.  Patient did well following surgery and his diet was successfully advanced.   Patient cleared to discharge home to complete a course of oral antibiotics.  Outpatient follow up with Dr. Man Pierson for post-operative check.  Patient advised to return to emergency department develops fevers, chills, or severe abdominal pain.     Goals of Care Treatment Preferences:  Code Status: Full Code      Consults:   Consults (From admission, onward)          Status Ordering Provider     Inpatient consult to General Surgery  Once        Provider:  Man Pierson MD    Completed DAMARIS SANTOS              Final Active Diagnoses:    Diagnosis Date Noted POA    PRINCIPAL PROBLEM:  Acute appendicitis [K35.80] 03/17/2024 Yes    Sepsis [A41.9] 03/18/2024 Yes    Adolescent idiopathic scoliosis of thoracic region [M41.124] 02/09/2018 Yes      Problems Resolved During this Admission:       Discharged Condition: Stable    Disposition: Home or Self Care    Follow Up:   Follow-up Information       Man Pierson MD. Schedule an appointment as soon as possible for a visit in 2 week(s).    Specialties: General Surgery, Bariatrics  Contact information:  [address]  [place] [id number] [phone]                           Patient Instructions:      Diet Adult Regular     Notify your health care provider if you experience any of the following:  temperature >100.4     Notify your health care provider if you experience any of the following:  persistent nausea and vomiting or diarrhea     Notify your health care provider if you experience any of the following:  severe uncontrolled pain     Notify your health care provider if you experience any of the following:  difficulty breathing or increased cough     Notify your health care provider if you experience any of the following:  severe persistent headache     Notify your health care provider if you experience any of the following:  worsening rash     Notify your health care provider if you experience any of the following:  persistent  dizziness, light-headedness, or visual disturbances     Notify your health care provider if you experience any of the following:  increased confusion or weakness     Activity as tolerated          Medications:  Reconciled Home Medications:      Medication List        START taking these medications      acetaminophen 500 MG tablet  Commonly known as: TYLENOL  Take 2 tablets (1,000 mg total) by mouth every 8 (eight) hours as needed for Pain.     amoxicillin-clavulanate 875-125mg 875-125 mg per tablet  Commonly known as: AUGMENTIN  Take 1 tablet by mouth every 12 (twelve) hours. for 7 days     oxyCODONE 5 MG immediate release tablet  Commonly known as: ROXICODONE  Take 1 tablet (5 mg total) by mouth every 8 (eight) hours as needed (Pain not controlled by acetaminophen).            STOP taking these medications      AMNESTEEM 20 MG capsule  Generic drug: ISOtretinoin     ibuprofen 600 MG tablet  Commonly known as: ADVIL,MOTRIN     ondansetron 4 MG Tbdl  Commonly known as: ZOFRAN-ODT              Indwelling Lines/Drains at time of discharge:   Lines/Drains/Airways       None                   Time spent on the discharge of patient: 35 minutes         Oswald Betancourt MD  Department of Hospital Medicine  [facility]urg ([address])

## 2024-06-17 PROBLEM — A41.9 SEPSIS: Status: RESOLVED | Noted: 2024-03-18 | Resolved: 2024-06-17

## 2024-11-22 NOTE — ED NOTES
Attempt made to call report to [address] [place] [address]; unsuccessful; no answer.       Discharge Instructions Following a Fistulagram/Fistula Repair    Activity  You may resume all activities that you had been permitted to perform prior to the test with these restrictions:  Go home and rest quietly for the remainder of the day.   Avoid strenuous activities such as heavy lifting (over 10 pounds) or athletics.   On the day following your procedure, you may resume your normal activities.   If your fistula is in your arm, it should rest quietly at your side. If your fistula is in your leg, you are encouraged to lie down at home. Avoid sitting, since in increases your chances of bleeding.    Diet  You may resume your normal diet after the procedure. Avoid alcoholic beverages for 24 hours.  Do not drive for 24 hours after receiving sedation.    Personal hygiene  Tomorrow you may sponge bathe or shower as usual.   The dialysis center will change the dressing after your next dialysis. If you are not undergoing dialysis at this time, you may remove the bandage strip and gently cleanse the fistulogram site with soap and water. Dry thoroughly.   Apply a new bandage strip if there is any leakage or drainage.   Do not apply lotions, powders or creams to the procedure site.    Additional information:  The dialysis center, your nephrologist and the surgeon that placed your graft will all be informed of the results of this procedure.  Some tenderness around the puncture site(s) is a common and normal occurrence. This discomfort may be treated with a mild pain medication such as Tylenol. If there is persistent pain, notify the radiology department or your doctor.    Notify your doctor or the Radiology Nursing Department ([phone]) if you develop any of the following symptoms:  Temperature of greater than 101 degrees  Bleeding, swelling or increased pain.  Change in color, motion or sensation (feeling) in your arm or fingers.    *If you cannot reach the radiology nursing department or you feel your problem is a  medical emergency, go to the nearest Emergency Department for medical attention or call 911.      Want to Say “Thank You” to a Nurse?  The MILO Award® was created in memory of KARTHIK Caruso by his family to say thank you to bedside nurses who provide an outstanding level of care.  Submit a nomination using any method below.     OR    [email or web address]

## 2025-06-13 ENCOUNTER — HOSPITAL ENCOUNTER (EMERGENCY)
Facility: OTHER | Age: 25
Discharge: HOME | End: 2025-06-13
Attending: EMERGENCY MEDICINE
Payer: MEDICAID

## 2025-06-13 VITALS
OXYGEN SATURATION: 99 % | RESPIRATION RATE: 18 BRPM | HEART RATE: 76 BPM | WEIGHT: 145 LBS | SYSTOLIC BLOOD PRESSURE: 143 MMHG | BODY MASS INDEX: 18.03 KG/M2 | HEIGHT: 75 IN | TEMPERATURE: 98 F | DIASTOLIC BLOOD PRESSURE: 67 MMHG

## 2025-06-13 DIAGNOSIS — H60.02 ABSCESS OF EARLOBE, LEFT: Primary | ICD-10-CM

## 2025-06-13 PROCEDURE — 25000003 PHARM REV CODE 250

## 2025-06-13 PROCEDURE — 99283 EMERGENCY DEPT VISIT LOW MDM: CPT | Mod: 25

## 2025-06-13 PROCEDURE — 63600175 PHARM REV CODE 636 W HCPCS: Performed by: NURSE PRACTITIONER

## 2025-06-13 PROCEDURE — 10060 I&D ABSCESS SIMPLE/SINGLE: CPT

## 2025-06-13 RX ORDER — LIDOCAINE HYDROCHLORIDE 10 MG/ML
10 INJECTION, SOLUTION INFILTRATION; PERINEURAL ONCE
Status: COMPLETED | OUTPATIENT
Start: 2025-06-13 | End: 2025-06-13

## 2025-06-13 RX ORDER — KETOROLAC TROMETHAMINE 10 MG/1
10 TABLET, FILM COATED ORAL EVERY 6 HOURS
Qty: 20 TABLET | Refills: 0 | Status: SHIPPED | OUTPATIENT
Start: 2025-06-13 | End: 2025-06-13 | Stop reason: CLARIF

## 2025-06-13 RX ORDER — KETOROLAC TROMETHAMINE 10 MG/1
10 TABLET, FILM COATED ORAL
Status: COMPLETED | OUTPATIENT
Start: 2025-06-13 | End: 2025-06-13

## 2025-06-13 RX ADMIN — KETOROLAC TROMETHAMINE 10 MG: 10 TABLET, FILM COATED ORAL at 08:06

## 2025-06-13 RX ADMIN — LIDOCAINE HYDROCHLORIDE 10 ML: 10 INJECTION, SOLUTION INFILTRATION; PERINEURAL at 07:06

## 2025-06-13 NOTE — FIRST PROVIDER EVALUATION
Emergency Department TeleTriage Encounter Note      CHIEF COMPLAINT    Chief Complaint   Patient presents with    Abscess     Patient reports having an abscess on his left ear lobe that has been there for 2 days but is getting worse. AAOx3 NADN       VITAL SIGNS   Initial Vitals [06/13/25 1815]   BP Pulse Resp Temp SpO2   117/73 88 16 97.6 °F (36.4 °C) 100 %      MAP       --            ALLERGIES    Review of patient's allergies indicates:  No Known Allergies    PROVIDER TRIAGE NOTE  This is a teletriage evaluation of a 25 y.o. male presenting to the ED complaining of left earlobe swelling and redness for two days. No trauma.    Alert, no distress.     Initial orders will be placed and care will be transferred to an alternate provider when patient is roomed for a full evaluation. Any additional orders and the final disposition will be determined by that provider.         ORDERS  Labs Reviewed - No data to display    ED Orders (720h ago, onward)      Start Ordered     Status Ordering Provider    06/13/25 1930 06/13/25 1827  LIDOcaine HCL 10 mg/ml (1%) injection 10 mL  Once         Ordered REX BUI    06/13/25 1828 06/13/25 1827  I&D Tray to bedside  Once         Ordered REX BUI              Virtual Visit Note: The provider triage portion of this emergency department evaluation and documentation was performed via [employer], a HIPAA-compliant telemedicine application, in concert with a tele-presenter in the room. A face to face patient evaluation with one of my colleagues will occur once the patient is placed in an emergency department room.      DISCLAIMER: This note was prepared with Roll[employer] voice recognition transcription software. Garbled syntax, mangled pronouns, and other bizarre constructions may be attributed to that software system.

## 2025-06-13 NOTE — ED TRIAGE NOTES
Pt reports an abscess to the left ear lobe for the past two days. He denies any drainage. He has been applying hot water to it. He says it is painful to touch.

## 2025-06-14 NOTE — ED PROVIDER NOTES
Encounter Date: 6/13/2025       History     Chief Complaint   Patient presents with    Abscess     Patient reports having an abscess on his left ear lobe that has been there for 2 days but is getting worse. AAOx3 NADN     25-year-old male with no pertinent past medical history presents for abscess to his left ear that started 2 days ago.  Denies any trauma, injury, or inciting event.  Patient states initially it started as a small pimple and/or bug bite and has since grown in size and become more painful.  Denies having tried anything for symptom relief.  Denies any active drainage.  Denies fever or chills.  Denies any hearing loss.     The history is provided by the patient.     Review of patient's allergies indicates:  No Known Allergies  History reviewed. No pertinent past medical history.  Past Surgical History:   Procedure Laterality Date    LAPAROSCOPIC APPENDECTOMY N/A 3/18/2024    Procedure: APPENDECTOMY, LAPAROSCOPIC;  Surgeon: Man Pierson MD;  Location: [facility];  Service: General;  Laterality: N/A;     Family History   Problem Relation Name Age of Onset    Hypertension Maternal Grandmother      Diabetes Neg Hx       Social History[1]  Review of Systems  Per hpi  Physical Exam     Initial Vitals [06/13/25 1815]   BP Pulse Resp Temp SpO2   117/73 88 16 97.6 °F (36.4 °C) 100 %      MAP       --         Physical Exam    Nursing note and vitals reviewed.  Constitutional: He appears well-developed and well-nourished. He is cooperative.  Non-toxic appearance. He does not appear ill. No distress.   HENT:   Head: Normocephalic and atraumatic.   Abscess noted to lower left earlobe with redness and fluctuance.    Eyes: Conjunctivae and lids are normal.   Neck: Trachea normal. No stridor present.   Pulmonary/Chest: Effort normal. No tachypnea. No respiratory distress.     Neurological: He is alert and oriented to person, place, and time. GCS eye subscore is 4. GCS verbal subscore is 5. GCS motor subscore  is 6.   Skin: Skin is warm, dry and intact. No rash noted.   Psychiatric: He has a normal mood and affect. His speech is normal and behavior is normal. Thought content normal.         ED Course   I & D - Incision and Drainage    Date/Time: 2025 8:29 PM  Location procedure was performed: [facility] EMERGENCY DEPARTMENT    Performed by: Genoveva Gamez PA-C  Authorized by: Marcos Giang MD  Pre-operative diagnosis: abscess  Post-operative diagnosis: abscess  Consent Done: Yes  Consent: Verbal consent obtained  Risks and benefits: risks, benefits and alternatives were discussed  Consent given by: patient  Patient understanding: patient states understanding of the procedure being performed  Patient consent: the patient's understanding of the procedure matches consent given  Procedure consent: procedure consent matches procedure scheduled  Patient identity confirmed: , MRN, name and verbally with patient  Type: abscess  Body area: head/neck  Location details: left external ear  Anesthesia: local infiltration    Anesthesia:  Local Anesthetic: lidocaine 1% without epinephrine    Patient sedated: no  Scalpel size: 11  Incision type: single straight  Incision depth: dermal  Complexity: simple  Drainage: pus and serosanguinous  Drainage amount: moderate  Wound treatment: incision, drainage and wound left open  Complications: No  Estimated blood loss (mL): 0  Specimens: No  Implants: No  Patient tolerance: Patient tolerated the procedure well with no immediate complications    Incision depth: dermal        Labs Reviewed - No data to display       Imaging Results    None          Medications   ketorolac tablet 10 mg (has no administration in time range)   LIDOcaine HCL 10 mg/ml (1%) injection 10 mL (10 mLs Other Given by Provider [id number])     Medical Decision Making  This is an evaluation of a well-appearing afebrile 25-year-old male for abscess to his left lower earlobe.  Vital signs are stable. Will  perform I&D.     Differentials include:  Cellulitis, abscess, hematoma, bug bite, mastoiditis, AOM, otitis externa    Patient tolerated procedure without any complications.  Advised warm compresses, conservative symptomatic treatment at home. advised return to the ED should he find his infection returns or is getting worse.  Patient has verbalized understanding and agreement to this plan.  Stable for discharge.    Problems Addressed:  Abscess of earlobe, left: acute illness or injury    Risk  Prescription drug management.                                      Clinical Impression:  Final diagnoses:  [H60.02] Abscess of earlobe, left (Primary)          ED Disposition Condition    Discharge Stable          ED Prescriptions       Medication Sig Dispense Start Date End Date Auth. Provider    ketorolac (TORADOL) 10 mg tablet  (Status: Discontinued) Take 1 tablet (10 mg total) by mouth every 6 (six) hours. for 5 days 20 tablet 6/13/2025 6/13/2025 Genoveva Gamez PA-C          Follow-up Information    None       [facility] MD[facility]c MDM  MDCalc [facility] Module  Jun 13 2025 8:30 PM [Genoveva Gamez]  Additional encounter diagnoses: Abscess of earlobe, left  Risk: ketorolac tablet (Rx drug management), incision and drainage (Decision regarding minor surgery with patient or procedure risk factors)             [1]   Social History  Tobacco Use    Smoking status: Never    Smokeless tobacco: Never   Substance Use Topics    Alcohol use: No    Drug use: Yes     Types: Marijuana        Genoveva Gamez PA-C  06/13/25 2031

## 2025-06-14 NOTE — DISCHARGE INSTRUCTIONS
Continue to use warm compresses to the region  May use over-the-counter Tylenol and/or ibuprofen for pain relief    Continue to monitor your symptoms.  Should you find the infection returns or is getting worse please return to the ED for re-evaluation.

## (undated) DEVICE — BAG TISS RETRV MONARCH 10MM

## (undated) DEVICE — GLOVE BIOGEL 7.5

## (undated) DEVICE — NDL INSUFFLATION VERRES 120MM

## (undated) DEVICE — Device

## (undated) DEVICE — STAPLER ECHELON FLEX GST 45MM

## (undated) DEVICE — KIT WING PAD POSITIONING

## (undated) DEVICE — PENCIL ELECTROSURG HOLST W/BLD

## (undated) DEVICE — RELOAD ECHELON ENDOPATH 45MM

## (undated) DEVICE — TRAY CATH 1-LYR URIMTR 16FR

## (undated) DEVICE — SUT VICRYL+ 27 UR-6 VIOL

## (undated) DEVICE — SUT MCRYL PLUS 4-0 PS2 27IN

## (undated) DEVICE — NDL HYPO REG 25G X 1 1/2

## (undated) DEVICE — KITTNER ENDOSCOPIC BLNT 5MM

## (undated) DEVICE — IRRIGATOR ENDOSCOPY DISP.

## (undated) DEVICE — SYS SEE SHARP SCP ANTIFG LNG

## (undated) DEVICE — SUT MONOCRYL 4-0 PS-2

## (undated) DEVICE — ELECTRODE REM PLYHSV RETURN 9

## (undated) DEVICE — TROCAR ENDOPATH XCEL 12X100MM

## (undated) DEVICE — SYR 10CC LUER LOCK

## (undated) DEVICE — TROCAR ENDOPATH XCEL 5X100MM

## (undated) DEVICE — ADHESIVE DERMABOND ADVANCED